# Patient Record
Sex: MALE | Race: WHITE | URBAN - METROPOLITAN AREA
[De-identification: names, ages, dates, MRNs, and addresses within clinical notes are randomized per-mention and may not be internally consistent; named-entity substitution may affect disease eponyms.]

---

## 2017-01-13 DIAGNOSIS — B20 HUMAN IMMUNODEFICIENCY VIRUS (HIV) DISEASE (H): Primary | ICD-10-CM

## 2017-01-24 ENCOUNTER — TRANSFERRED RECORDS (OUTPATIENT)
Dept: HEALTH INFORMATION MANAGEMENT | Facility: CLINIC | Age: 72
End: 2017-01-24

## 2017-02-03 ASSESSMENT — ENCOUNTER SYMPTOMS
HALLUCINATIONS: 0
EYE PAIN: 0
FATIGUE: 1
NIGHT SWEATS: 0
CHILLS: 0
MEMORY LOSS: 0
HEARTBURN: 1
BOWEL INCONTINENCE: 0
PARALYSIS: 0
NUMBNESS: 0
FEVER: 0
DYSURIA: 0
RECTAL BLEEDING: 0
RECTAL PAIN: 0
SEIZURES: 0
POLYPHAGIA: 1
CONSTIPATION: 1
WEAKNESS: 0
POLYDIPSIA: 0
FLANK PAIN: 1
TREMORS: 0
WEIGHT LOSS: 0
EYE IRRITATION: 0
DOUBLE VISION: 0
JAUNDICE: 0
HEMATURIA: 0
EYE REDNESS: 0
DIFFICULTY URINATING: 1
HEADACHES: 1
WEIGHT GAIN: 0
VOMITING: 0
BLOATING: 1
DISTURBANCES IN COORDINATION: 0
DIARRHEA: 0
BLOOD IN STOOL: 0
NAUSEA: 1
SPEECH CHANGE: 0
TINGLING: 0
DIZZINESS: 0
ALTERED TEMPERATURE REGULATION: 1
DECREASED APPETITE: 0
ABDOMINAL PAIN: 1
LOSS OF CONSCIOUSNESS: 0

## 2017-02-03 ASSESSMENT — ACTIVITIES OF DAILY LIVING (ADL)
ARE_THERE_CARBON_MONOXIDE_DETECTORS_IN_YOUR_HOME?: Y
ARE_THERE_SMOKE_DETECTORS_IN_YOUR_HOME?: Y
DO_MEMBERS_OF_YOUR_HOUSEHOLD_USE_SUNSCREEN?: Y
DO_MEMBERS_OF_YOUR_HOUSEHOLD_USE_SAFETY_HELMETS?: Y
ARE_THERE_FIREARMS_IN_YOUR_HOME?: N
DO_MEMBERS_OF_YOUR_HOUSEHOLD_WEAR_SEAT_BELTS?: Y

## 2017-02-08 DIAGNOSIS — B20 HUMAN IMMUNODEFICIENCY VIRUS (HIV) DISEASE (H): ICD-10-CM

## 2017-02-08 LAB
ALBUMIN SERPL-MCNC: 3.9 G/DL (ref 3.4–5)
ALP SERPL-CCNC: 124 U/L (ref 40–150)
ALT SERPL W P-5'-P-CCNC: 30 U/L (ref 0–70)
AMYLASE SERPL-CCNC: 58 U/L (ref 30–110)
ANION GAP SERPL CALCULATED.3IONS-SCNC: 6 MMOL/L (ref 3–14)
AST SERPL W P-5'-P-CCNC: 18 U/L (ref 0–45)
BASOPHILS # BLD AUTO: 0 10E9/L (ref 0–0.2)
BASOPHILS NFR BLD AUTO: 0.3 %
BILIRUB SERPL-MCNC: 0.5 MG/DL (ref 0.2–1.3)
BUN SERPL-MCNC: 16 MG/DL (ref 7–30)
CALCIUM SERPL-MCNC: 8.9 MG/DL (ref 8.5–10.1)
CD3 CELLS # BLD: 1626 CELLS/UL (ref 603–2990)
CD3 CELLS NFR BLD: 82 % (ref 49–84)
CD3+CD4+ CELLS # BLD: 469 CELLS/UL (ref 441–2156)
CD3+CD4+ CELLS NFR BLD: 24 % (ref 28–63)
CD3+CD4+ CELLS/CD3+CD8+ CLL BLD: 0.41 % (ref 1.4–2.6)
CD3+CD8+ CELLS # BLD: 1167 CELLS/UL (ref 125–1312)
CD3+CD8+ CELLS NFR BLD: 59 % (ref 10–40)
CHLORIDE SERPL-SCNC: 105 MMOL/L (ref 94–109)
CO2 SERPL-SCNC: 30 MMOL/L (ref 20–32)
CREAT SERPL-MCNC: 0.89 MG/DL (ref 0.66–1.25)
DIFFERENTIAL METHOD BLD: NORMAL
EOSINOPHIL # BLD AUTO: 0.2 10E9/L (ref 0–0.7)
EOSINOPHIL NFR BLD AUTO: 2.9 %
ERYTHROCYTE [DISTWIDTH] IN BLOOD BY AUTOMATED COUNT: 13.4 % (ref 10–15)
GFR SERPL CREATININE-BSD FRML MDRD: 84 ML/MIN/1.7M2
GLUCOSE SERPL-MCNC: 102 MG/DL (ref 70–99)
HCT VFR BLD AUTO: 42 % (ref 40–53)
HGB BLD-MCNC: 13.8 G/DL (ref 13.3–17.7)
IFC SPECIMEN: ABNORMAL
IMM GRANULOCYTES # BLD: 0 10E9/L (ref 0–0.4)
IMM GRANULOCYTES NFR BLD: 0.2 %
IMMUNODEFICIENCY MARKERS SPEC-IMP: ABNORMAL
LIPASE SERPL-CCNC: 154 U/L (ref 73–393)
LYMPHOCYTES # BLD AUTO: 1.9 10E9/L (ref 0.8–5.3)
LYMPHOCYTES NFR BLD AUTO: 29.7 %
MCH RBC QN AUTO: 30.4 PG (ref 26.5–33)
MCHC RBC AUTO-ENTMCNC: 32.9 G/DL (ref 31.5–36.5)
MCV RBC AUTO: 93 FL (ref 78–100)
MONOCYTES # BLD AUTO: 0.5 10E9/L (ref 0–1.3)
MONOCYTES NFR BLD AUTO: 8.6 %
NEUTROPHILS # BLD AUTO: 3.7 10E9/L (ref 1.6–8.3)
NEUTROPHILS NFR BLD AUTO: 58.3 %
NRBC # BLD AUTO: 0 10*3/UL
NRBC BLD AUTO-RTO: 0 /100
PLATELET # BLD AUTO: 225 10E9/L (ref 150–450)
POTASSIUM SERPL-SCNC: 4.6 MMOL/L (ref 3.4–5.3)
PROT SERPL-MCNC: 7.1 G/DL (ref 6.8–8.8)
RBC # BLD AUTO: 4.54 10E12/L (ref 4.4–5.9)
SODIUM SERPL-SCNC: 141 MMOL/L (ref 133–144)
WBC # BLD AUTO: 6.3 10E9/L (ref 4–11)

## 2017-02-08 PROCEDURE — 87536 HIV-1 QUANT&REVRSE TRNSCRPJ: CPT | Performed by: INTERNAL MEDICINE

## 2017-02-08 PROCEDURE — 86360 T CELL ABSOLUTE COUNT/RATIO: CPT | Performed by: INTERNAL MEDICINE

## 2017-02-08 PROCEDURE — 86359 T CELLS TOTAL COUNT: CPT | Performed by: INTERNAL MEDICINE

## 2017-02-10 LAB
HIV1 RNA # PLAS NAA DL=20: NORMAL {COPIES}/ML
HIV1 RNA SERPL NAA+PROBE-LOG#: NORMAL {LOG_COPIES}/ML

## 2017-02-17 ENCOUNTER — OFFICE VISIT (OUTPATIENT)
Dept: INTERNAL MEDICINE | Facility: CLINIC | Age: 72
End: 2017-02-17

## 2017-02-17 VITALS
DIASTOLIC BLOOD PRESSURE: 76 MMHG | WEIGHT: 150 LBS | BODY MASS INDEX: 22.73 KG/M2 | RESPIRATION RATE: 20 BRPM | OXYGEN SATURATION: 96 % | HEIGHT: 68 IN | HEART RATE: 81 BPM | SYSTOLIC BLOOD PRESSURE: 130 MMHG

## 2017-02-17 DIAGNOSIS — Z87.891 HISTORY OF TOBACCO USE: ICD-10-CM

## 2017-02-17 DIAGNOSIS — Z13.820 SCREENING FOR OSTEOPOROSIS: ICD-10-CM

## 2017-02-17 DIAGNOSIS — R79.9 ABNORMAL FINDING OF BLOOD CHEMISTRY: ICD-10-CM

## 2017-02-17 DIAGNOSIS — B20 HUMAN IMMUNODEFICIENCY VIRUS (HIV) DISEASE (H): ICD-10-CM

## 2017-02-17 DIAGNOSIS — Z87.891 HISTORY OF SMOKING: ICD-10-CM

## 2017-02-17 DIAGNOSIS — Z13.6 SCREENING FOR AAA (ABDOMINAL AORTIC ANEURYSM): ICD-10-CM

## 2017-02-17 DIAGNOSIS — D48.5 NEOPLASM OF UNCERTAIN BEHAVIOR OF SKIN: ICD-10-CM

## 2017-02-17 DIAGNOSIS — Z13.1 SCREENING FOR DIABETES MELLITUS: ICD-10-CM

## 2017-02-17 DIAGNOSIS — Z11.59 NEED FOR HEPATITIS C SCREENING TEST: ICD-10-CM

## 2017-02-17 DIAGNOSIS — Z00.00 ROUTINE GENERAL MEDICAL EXAMINATION AT A HEALTH CARE FACILITY: Primary | ICD-10-CM

## 2017-02-17 DIAGNOSIS — C32.9 LARYNGEAL CANCER (H): ICD-10-CM

## 2017-02-17 DIAGNOSIS — Z86.0100 HISTORY OF COLONIC POLYPS: ICD-10-CM

## 2017-02-17 LAB — HBA1C MFR BLD: 5.2 % (ref 4.3–6)

## 2017-02-17 PROCEDURE — 86803 HEPATITIS C AB TEST: CPT | Performed by: INTERNAL MEDICINE

## 2017-02-17 RX ORDER — TIMOLOL MALEATE 5 MG/ML
1 SOLUTION/ DROPS OPHTHALMIC 2 TIMES DAILY
COMMUNITY

## 2017-02-17 RX ORDER — LATANOPROST 50 UG/ML
1 SOLUTION/ DROPS OPHTHALMIC AT BEDTIME
COMMUNITY

## 2017-02-17 RX ORDER — LORATADINE 10 MG/1
10 TABLET ORAL PRN
COMMUNITY

## 2017-02-17 RX ORDER — CETIRIZINE HYDROCHLORIDE 10 MG/1
10 TABLET ORAL DAILY PRN
COMMUNITY

## 2017-02-17 RX ORDER — EMTRICITABINE AND TENOFOVIR DISOPROXIL FUMARATE 200; 300 MG/1; MG/1
1 TABLET, FILM COATED ORAL DAILY
COMMUNITY
End: 2017-02-17

## 2017-02-17 RX ORDER — PRAMIPEXOLE DIHYDROCHLORIDE 0.5 MG/1
TABLET ORAL DAILY
COMMUNITY

## 2017-02-17 ASSESSMENT — PAIN SCALES - GENERAL: PAINLEVEL: MILD PAIN (3)

## 2017-02-17 NOTE — MR AVS SNAPSHOT
After Visit Summary   2/17/2017    Leighton Randall    MRN: [id number]           Patient Information     Date Of Birth          1945        Visit Information        Provider Department      2/17/2017 1:50 PM Digna Tay MD [facility]        Today's Diagnoses     History of tobacco use    -  1    Need for hepatitis C screening test        Screening for AAA (abdominal aortic aneurysm)        History of smoking        History of colonic polyps        Laryngeal cancer (H)        Neoplasm of uncertain behavior of skin        Screening for diabetes mellitus        Abnormal finding of blood chemistry           Care Instructions    Primary Care Center Medication Refill Request Information:  * Please contact your pharmacy regarding ANY request for medication refills.  ** [facility] Prescription Fax = [phone]  * Please allow 3 business days for routine medication refills.  * Please allow 5 business days for controlled substance medication refills.     Primary Care Center Test Result notification information:  *You will be notified with in 7-10 days of your appointment day regarding the results of your test.  If you are on MyChart you will be notified as soon as the provider has reviewed the results and signed off on them.  Lung Cancer Screening   Frequently Asked Questions  If you are at high-risk for lung cancer, getting screened with low-dose computed tomography (LDCT) every year can help save your life. This handout offers answers to some of the most common questions about lung cancer screening. If you have other questions, please call [phone] ([phone]).     What is it?  Lung cancer screening uses special X-ray technology to create an image of your lung tissue. The exam is quick and easy and takes less than 10 seconds. We don t give you any medicine or use any needles. You can eat before and after the exam. You don t need to change your clothes as long as the  clothing on your chest doesn t contain metal. But, you do need to be able to hold your breath for at least 6 seconds during the exam.    What is the goal of lung cancer screening?  The goal of lung cancer screening is to save lives. Many times, lung cancer is not found until a person starts having physical symptoms. Lung cancer screening can help detect lung cancer in the earliest stages when it may be easier to treat.    Who should be screened for lung cancer?  We suggest lung cancer screening for anyone who is at high-risk for lung cancer. You are in the high-risk group if you:      are between the ages of 55 and 79, and    have smoked at least 1 pack of cigarettes a day for 30 or more years, and    still smoke or have quit within the past 15 years.    However, if you have a new cough or shortness of breath, you should talk to your doctor before being screened.    Some national lung health advocacy groups also recommend screening for people ages 50 to 79 who have smoked an average of 1 pack of cigarettes a day for 20 years. They must also have at least 1 other risk factor for lung cancer, not including exposure to secondhand smoke. Other risk factors are having had cancer in the past, emphysema, pulmonary fibrosis, COPD, a family history of lung cancer, or exposure to certain materials such as arsenic, asbestos, beryllium, cadmium, chromium, diesel fumes, nickel, radon or silica. Your care team can help you know if you have one of these risk factors.     Why does it matter if I have symptoms?  Certain symptoms can be a sign that you have a condition in your lungs that should be checked and treated by your doctor. These symptoms include fever, chest pain, a new or changing cough, shortness of breath that you have never felt before, coughing up blood or unexplained weight loss. Having any of these symptoms can greatly affect the results of lung cancer screening.       Should all smokers get an LDCT lung cancer  screening exam?  It depends. Lung cancer screening is for a very specific group of men and women who have a history of heavy smoking over a long period of time (see  Who should be screened for lung cancer  above).  I am in the high-risk group, but have been diagnosed with cancer in the past. Is LDCT lung cancer screening right for me?  In some cases, you should not have LDCT lung screening, such as when your doctor is already following your cancer with CT scan studies. Your doctor will help you decide if LDCT lung screening is right for you.  Do I need to have a screening exam every year?  Yes. If you are in the high-risk group described earlier, you should get an LDCT lung cancer screening exam every year until you are 79, or are no longer willing or able to undergo screening and possible procedures to diagnose and treat lung cancer.  How effective is LDCT at preventing death from lung cancer?  Studies have shown that LDCT lung cancer screening can lower the risk of death from lung cancer by 20 percent in people who are at high-risk.  What are the risks?  There are some risks and limitations of LDCT lung cancer screening. We want to make sure you understand the risks and benefits, so please let us know if you have any questions. Your doctor may want to talk with you more about these risks.    Radiation exposure: As with any exam that uses radiation, there is a very small increased risk of cancer. The amount of radiation in LDCT is small--about the same amount a person would get from a mammogram. Your doctor orders the exam when he or she feels the potential benefits outweigh the risks.    False negatives: No test is perfect, including LDCT. It is possible that you may have a medical condition, including lung cancer, that is not found during your exam. This is called a false negative result.    False positives and more testing: LDCT very often finds something in the lung that could be cancer, but in fact is not.  This is called a false positive result. False positive tests often cause anxiety. To make sure these findings are not cancer, you may need to have more tests. These tests will be done only if you give us permission. Sometimes patients need a treatment that can have side effects, such as a biopsy. For more information on false positives, see  What can I expect from the results?     Findings not related to lung cancer: Your LDCT exam also takes pictures of areas of your body next to your lungs. In a very small number of cases, the CT scan will show an abnormal finding in one of these areas, such as your kidneys, adrenal glands, liver or thyroid. This finding may not be serious, but you may need more tests. Your doctor can help you decide what other tests you may need, if any.  What can I expect from the results?  About 1 out of 4 LDCT exams will find something that may need more tests. Most of the time, these findings are lung nodules. Lung nodules are very small collections of tissue in the lung. These nodules are very common, and the vast majority--more than 97 percent--are not cancer (benign). Most are normal lymph nodes or small areas of scarring from past infections.  But, if a small lung nodule is found to be cancer, the cancer can be cured more than 90 percent of the time. To know if the nodule is cancer, we may need to get more images before your next yearly screening exam. If the nodule has suspicious features (for example, it is large, has an odd shape or grows over time), we will refer you to a specialist for further testing.  Will my doctor also get the results?  Yes. Your doctor will get a copy of your results.  Is it okay to keep smoking now that there s a cancer screening exam?  No. Tobacco is one of the strongest cancer-causing agents. It causes not only lung cancer, but other cancers and cardiovascular (heart) diseases as well. The damage caused by smoking builds over time. This means that the longer  you smoke, the higher your risk of disease. While it is never too late to quit, the sooner you quit, the better.  Where can I find help to quit smoking?  The best way to prevent lung cancer is to stop smoking. If you have already quit smoking, congratulations and keep it up! For help on quitting smoking, please call [employer] at 6-641-646-RRTD (4343) or the American Cancer Society at 1-478.111.2816 to find local resources near you.  One-on-one health coaching:  If you d prefer to work individually with a health care provider on tobacco cessation, we offer:      Medication Therapy Management:  Our specially trained pharmacists work closely with you and your doctor to help you quit smoking.  Call [phone] or [phone] (toll free).     Can Do: Health coaching offered by [facility].  [email or web address]                Follow-ups after your visit        Additional Services     DERMATOLOGY REFERRAL       Your provider has referred you to: [facility]: Dermatology Clinic - [place] [phone]   [email or web address]    Lesion on r ear.  Poss basal cell ca  Please be aware that coverage of these services is subject to the terms and limitations of your health insurance plan.  Call member services at your health plan with any benefit or coverage questions.      Please bring the following with you to your appointment:    (1) Any X-Rays, CTs or MRIs which have been performed.  Contact the facility where they were done to arrange for  prior to your scheduled appointment.    (2) List of current medications  (3) This referral request   (4) Any documents/labs given to you for this referral                  Your next 10 appointments already scheduled     Feb 17, 2017  3:15 PM CST   LAB with  LAB    Health Lab ([facility])    [address]  1st Floor  [place] [address] [phone]           Patient must bring picture ID.   Patient should be prepared to give a urine specimen  Please do not eat 10-12 hours before your appointment if you are coming in fasting for labs on lipids, cholesterol, or glucose (sugar).  Pregnant women should follow their Care Team instructions. Water with medications is okay. Do not drink coffee or other fluids.   If you have concerns about taking  your medications, please ask at office or if scheduling via [employer], send a message by clicking on Secure Messaging, Message Your Care Team.            Feb 20, 2017  8:30 AM CST   (Arrive by 8:15 AM)   New Patient Visit with Gail Kirkland MD   [facility] ([facility])    [address] [place] [address] [phone]            Mar 21, 2017  1:45 PM CDT   (Arrive by 1:30 PM)   New Patient Visit with Nahum Boo MD   [facility] ([facility])    [address] [place] [address] [phone]              Future tests that were ordered for you today     Open Future Orders        Priority Expected Expires Ordered    Hepatitis C Screen Reflex to HCV RNA Quant and Genotype Routine 2/17/2017 2/17/2018 2/17/2017    [place] Aorta Medicare AAA Screening Routine  2/17/2018 2/17/2017    Hemoglobin A1c Routine 2/17/2017 3/3/2017 2/17/2017    CT Chest Lung Cancer Scrn Low Dose wo Routine  2/17/2018 2/17/2017            Who to contact     Please call your clinic at [phone] to:    Ask questions about your health    Make or cancel appointments    Discuss your medicines    Learn about your test results    Speak to your doctor   If you have compliments or concerns about an experience at your clinic, or if you wish to file a complaint, please contact [facility] Patient Relations at [phone] or email us at [email or web address]         Additional Information About Your Visit    "     MyChart Information     [employer] gives you secure access to your electronic health record. If you see a primary care provider, you can also send messages to your care team and make appointments. If you have questions, please call your primary care clinic.  If you do not have a primary care provider, please call [phone] and they will assist you.      [employer] is an electronic gateway that provides easy, online access to your medical records. With [employer], you can request a clinic appointment, read your test results, renew a prescription or communicate with your care team.     To access your existing account, please contact your [facility] or call [phone] for assistance.        Care EveryWhere ID     This is your Care EveryWhere ID. This could be used by other organizations to access your [place] medical records  [id number]        Your Vitals Were     Pulse Respirations Height Pulse Oximetry BMI (Body Mass Index)       81 20 1.727 m (5' 8\") 96% 22.81 kg/m2        Blood Pressure from Last 3 Encounters:   02/17/17 130/76    Weight from Last 3 Encounters:   02/17/17 68 kg (150 lb)              We Performed the Following     Aortic Center Notification     DERMATOLOGY REFERRAL     [place] for Smoking Cessation Study (PLUTO) to Contact Patient     Prof fee: Shared Decisionmaking for Lung Cancer Screening          Today's Medication Changes          These changes are accurate as of: 2/17/17  3:12 PM.  If you have any questions, ask your nurse or doctor.               These medicines have changed or have updated prescriptions.        Dose/Directions    DESCOVY PO   This may have changed:  Another medication with the same name was removed. Continue taking this medication, and follow the directions you see here.   Changed by:  Digna Tay MD        Dose:  1 tablet   Take 1 tablet by mouth daily   Refills:  0         Stop taking these medicines if you haven't already. Please " contact your care team if you have questions.     TRUVADA 200-300 MG per tablet   Generic drug:  emtricitabine-tenofovir   Stopped by:  Digna Tay MD                    Primary Care Provider Office Phone # Fax #    Digna Tay [phone]       [facility] [address] [address]        Thank you!     Thank you for choosing [facility]  for your care. Our goal is always to provide you with excellent care. Hearing back from our patients is one way we can continue to improve our services. Please take a few minutes to complete the written survey that you may receive in the mail after your visit with us. Thank you!             Your Updated Medication List - Protect others around you: Learn how to safely use, store and throw away your medicines at www.disposemymeds.org.          This list is accurate as of: 2/17/17  3:12 PM.  Always use your most recent med list.                   Brand Name Dispense Instructions for use    BENADRYL PO      Take 25 mg by mouth as needed       cetirizine 10 MG tablet    zyrTEC     Take 10 mg by mouth daily as needed for allergies       DESCOVY PO      Take 1 tablet by mouth daily       dolutegravir 50 MG tablet    TIVICAY     Take 50 mg by mouth daily       loratadine 10 MG tablet    CLARITIN     Take 10 mg by mouth as needed for allergies       OMEPRAZOLE PO      Take 20 mg by mouth       pramipexole 0.5 MG tablet    MIRAPEX     Take by mouth daily       timolol 0.5 % ophthalmic solution    TIMOPTIC     Place 1 drop Into the left eye 2 times daily       TYLENOL PO      Take 325 mg by mouth as needed for mild pain or fever       XALATAN 0.005 % ophthalmic solution   Generic drug:  latanoprost      Place 1 drop into both eyes At Bedtime

## 2017-02-17 NOTE — PROGRESS NOTES
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 Tay. He was previously cared for through [facility]. He has no current complaints. He was recently seen by GI for foul-smelling stools and bloating diagnosed as SIBO and treated empirically with rifaximin. Since then, his symptoms have improved. He denies CP, headache, vision changes, hearing changes, chronic abdominal pain, SOB, LE edema, claudication, or fevers. He mentions that over the past year since his abdominal surgeries he has felt slightly more fatigued and \"out of shape\", most notable when he exerts himself.     HCM:   tdap 5/2013  Pneumovax utd    Colonoscopy: done at [facility] 11/3/16            Social History   Substance Use Topics     Smoking status: Former Smoker     Types: Cigarettes     Smokeless tobacco: Never Used      Comment: Stopped two years ago     Alcohol use Not on file   40 pack-year smoking history. Reports playing on asbestos-coated pipes as a child.     Fhx:   Medical History Relation Name Comments   Glaucoma Mother       Diabetes Sister             Medications and allergies reviewed by me today.       Review Of Systems    Answers for HPI/ROS submitted by the patient on 2/3/2017   Annual Exam:  General Symptoms: Yes  Skin Symptoms: No  HENT Symptoms: No  EYE SYMPTOMS: Yes  HEART SYMPTOMS: No  LUNG SYMPTOMS: No  INTESTINAL SYMPTOMS: Yes  URINARY SYMPTOMS: Yes  REPRODUCTIVE SYMPTOMS: No  SKELETAL SYMPTOMS: No  BLOOD SYMPTOMS: No  NERVOUS SYSTEM SYMPTOMS: Yes  MENTAL HEALTH SYMPTOMS: No  Fever: No  Loss of appetite: No  Weight loss: No  Weight gain: No  Fatigue: Yes  Night sweats: No  Chills: No  Increased stress: No  Excessive hunger: Yes  Excessive thirst: No  Feeling hot or " "cold when others believe the temperature is normal: Yes  Loss of height: No  Post-operative complications: Yes  Surgical site pain: Yes  Hallucinations: No  Hyperactivity: No  Confusion: No  Eye pain: No  Vision loss: No  Dry eyes: No  Eye bulging: No  Double vision: No  Flashing of lights: No  Spots: No  Floaters: Yes  Redness: No  Crossed eyes: No  Tunnel Vision: No  Yellowing of eyes: No  Eye irritation: No  Heart burn or indigestion: Yes  Nausea: Yes  Vomiting: No  Abdominal pain: Yes  Bloating: Yes  Constipation: Yes  Diarrhea: No  Blood in stool: No  Black stools: No  Rectal or Anal pain: No  Fecal incontinence: No  Rectal bleeding: No  Yellowing of skin or eyes: No  Vomit with blood: No  Change in stools: Yes  Hemorrhoids: Yes  Trouble holding urine or incontinence: No  Pain or burning: No  Trouble starting or stopping: Yes  Increased frequency of urination: No  Blood in urine: No  Decreased frequency of urination: No  Frequent nighttime urination: No  Flank pain: Yes  Difficulty emptying bladder: Yes  Trouble with coordination: No  Dizziness or trouble with balance: No  Fainting or black-out spells: No  Memory loss: No  Headache: Yes  Seizures: No  Speech problems: No  Tingling: No  Tremor: No  Weakness: No  Difficulty walking: No  Paralysis: No  Numbness: No  Frequency of exercise:: 2-3 days/week  Duration of exercise:: Greater than 60 minutes    OBJECTIVE:    /76 (BP Location: Right arm, Patient Position: Chair, Cuff Size: Adult Regular)  Pulse 81  Resp 20  Ht 1.727 m (5' 8\")  Wt 68 kg (150 lb)  SpO2 96%  BMI 22.81 kg/m2   Wt Readings from Last 1 Encounters:   02/17/17 68 kg (150 lb)       Constitutional: no distress, comfortable, pleasant   HEENT anicteric  PERRL  OP clear, Dentition fair with missing tooth. 3mm pearly appearing dome-shaped mass on lobe of R ear.  Also darkly pigmented lesion approx 6 mm in size upper L back with some irregularity.   Neck:  supple with full range of motion, no " thyromegaly.   Nodes: no cervical, supraclavicular or axillary lad  Cardiovascular: regular rate and rhythm, normal S1 and S2, no murmurs, rubs or gallops,   Respiratory: clear to auscultation, no wheezes or crackles, normal breath sounds and normal expiratory phase, expiration through pursed lips  Gastrointestinal: 6 cm by 11 cm scar over midline abdomen at umbilicus without drainage, NT/ND,   positive bowel sounds all four quadrants no hepatosplenomegaly, no masses   Ext:  no edema      ASSESSMENT/PLAN:  Leighton was seen today for establish care.  Reviewed and updated HCM today.  Immunizations are utd.  Due for AAA screening, DEXA scan (HIV infectin) Hep C screening and lung cancer screening.   Will also have A1c done as well.  Lipids done 5/16 and was normal.     Diagnoses and all orders for this visit:    History of tobacco use- lung cancer screening.   -     Prof fee: Shared Decisionmaking for Lung Cancer Screening  -     CT Chest Lung Cancer Scrn Low Dose wo; Future  -     Okay for Smoking Cessation Study (PLUTO) to Contact Patient    Need for hepatitis C screening test  -     Hepatitis C Screen Reflex to HCV RNA Quant and Genotype; Future    Screening for AAA (abdominal aortic aneurysm)  -     Aortic Center Notification  -      Aorta Medicare AAA Screening; Future    History of colonic polyps  -Recent colonoscopy in 11/2016.     Laryngeal cancer (H)- had evaluation in 11/16.    -Up to date on follow-up with ENT     Neoplasm of uncertain behavior of skin- papular lesion pearly R earlobe approx 5 mm in size with visible vessel.  Suspect basal cell.  Also ? Dysplastic nevus L upper back.    -     DERMATOLOGY REFERRAL    Screening for diabetes mellitus  -     Hemoglobin A1c; Future    Abnormal finding of blood chemistry   -     Hemoglobin A1c; Future          Pt should return to clinic for f/u with me in 4 months or sooner as needed.       Nicolás Campos MS4, acting as a scribe for Digna Tay,  MD          Lung Cancer Screening Shared Decision Making Visit     Leighton Randall is eligible for lung cancer screening on the basis of the information provided in my signed lung cancer screening order.     I have discussed with patient the risks and benefits of screening for lung cancer with low-dose CT.     The risks include:   radiation exposure    false positives     over-diagnosis    The benefit of early detection of lung cancer is contingent upon adherence to annual screening or more frequent follow up if indicated.     Furthermore, reaping the benefits of screening requires Leighton Randall to be willing and physically able to undergo diagnostic procedures, if indicated. Although no specific guide is available for determining severity of comorbidities, it is reasonable to withhold screening in patients who have greater mortality risk from other diseases.     We did discuss that the only way to prevent lung cancer is to not smoke. Smoking cessation assistance was not offered.    I did not offer risk estimation using a calculator such as this one:    ShouldIScreen    The medical student acted as scribe and the encounter documented above was completely performed by myself.  Supervising Doctor Digna Tay

## 2017-02-17 NOTE — PATIENT INSTRUCTIONS
[facility] Medication Refill Request Information:  * Please contact your pharmacy regarding ANY request for medication refills.  ** [facility] Prescription Fax = [phone]  * Please allow 3 business days for routine medication refills.  * Please allow 5 business days for controlled substance medication refills.     [facility] Test Result notification information:  *You will be notified with in 7-10 days of your appointment day regarding the results of your test.  If you are on MyChart you will be notified as soon as the provider has reviewed the results and signed off on them.  Lung Cancer Screening   Frequently Asked Questions  If you are at high-risk for lung cancer, getting screened with low-dose computed tomography (LDCT) every year can help save your life. This handout offers answers to some of the most common questions about lung cancer screening. If you have other questions, please call [phone] ([phone]).     What is it?  Lung cancer screening uses special X-ray technology to create an image of your lung tissue. The exam is quick and easy and takes less than 10 seconds. We don t give you any medicine or use any needles. You can eat before and after the exam. You don t need to change your clothes as long as the clothing on your chest doesn t contain metal. But, you do need to be able to hold your breath for at least 6 seconds during the exam.    What is the goal of lung cancer screening?  The goal of lung cancer screening is to save lives. Many times, lung cancer is not found until a person starts having physical symptoms. Lung cancer screening can help detect lung cancer in the earliest stages when it may be easier to treat.    Who should be screened for lung cancer?  We suggest lung cancer screening for anyone who is at high-risk for lung cancer. You are in the high-risk group if you:      are between the ages of 55 and 79, and    have smoked at least 1 pack of cigarettes a day  for 30 or more years, and    still smoke or have quit within the past 15 years.    However, if you have a new cough or shortness of breath, you should talk to your doctor before being screened.    Some national lung health advocacy groups also recommend screening for people ages 50 to 79 who have smoked an average of 1 pack of cigarettes a day for 20 years. They must also have at least 1 other risk factor for lung cancer, not including exposure to secondhand smoke. Other risk factors are having had cancer in the past, emphysema, pulmonary fibrosis, COPD, a family history of lung cancer, or exposure to certain materials such as arsenic, asbestos, beryllium, cadmium, chromium, diesel fumes, nickel, radon or silica. Your care team can help you know if you have one of these risk factors.     Why does it matter if I have symptoms?  Certain symptoms can be a sign that you have a condition in your lungs that should be checked and treated by your doctor. These symptoms include fever, chest pain, a new or changing cough, shortness of breath that you have never felt before, coughing up blood or unexplained weight loss. Having any of these symptoms can greatly affect the results of lung cancer screening.       Should all smokers get an LDCT lung cancer screening exam?  It depends. Lung cancer screening is for a very specific group of men and women who have a history of heavy smoking over a long period of time (see  Who should be screened for lung cancer  above).  I am in the high-risk group, but have been diagnosed with cancer in the past. Is LDCT lung cancer screening right for me?  In some cases, you should not have LDCT lung screening, such as when your doctor is already following your cancer with CT scan studies. Your doctor will help you decide if LDCT lung screening is right for you.  Do I need to have a screening exam every year?  Yes. If you are in the high-risk group described earlier, you should get an LDCT lung  cancer screening exam every year until you are 79, or are no longer willing or able to undergo screening and possible procedures to diagnose and treat lung cancer.  How effective is LDCT at preventing death from lung cancer?  Studies have shown that LDCT lung cancer screening can lower the risk of death from lung cancer by 20 percent in people who are at high-risk.  What are the risks?  There are some risks and limitations of LDCT lung cancer screening. We want to make sure you understand the risks and benefits, so please let us know if you have any questions. Your doctor may want to talk with you more about these risks.    Radiation exposure: As with any exam that uses radiation, there is a very small increased risk of cancer. The amount of radiation in LDCT is small--about the same amount a person would get from a mammogram. Your doctor orders the exam when he or she feels the potential benefits outweigh the risks.    False negatives: No test is perfect, including LDCT. It is possible that you may have a medical condition, including lung cancer, that is not found during your exam. This is called a false negative result.    False positives and more testing: LDCT very often finds something in the lung that could be cancer, but in fact is not. This is called a false positive result. False positive tests often cause anxiety. To make sure these findings are not cancer, you may need to have more tests. These tests will be done only if you give us permission. Sometimes patients need a treatment that can have side effects, such as a biopsy. For more information on false positives, see  What can I expect from the results?     Findings not related to lung cancer: Your LDCT exam also takes pictures of areas of your body next to your lungs. In a very small number of cases, the CT scan will show an abnormal finding in one of these areas, such as your kidneys, adrenal glands, liver or thyroid. This finding may not be serious,  but you may need more tests. Your doctor can help you decide what other tests you may need, if any.  What can I expect from the results?  About 1 out of 4 LDCT exams will find something that may need more tests. Most of the time, these findings are lung nodules. Lung nodules are very small collections of tissue in the lung. These nodules are very common, and the vast majority--more than 97 percent--are not cancer (benign). Most are normal lymph nodes or small areas of scarring from past infections.  But, if a small lung nodule is found to be cancer, the cancer can be cured more than 90 percent of the time. To know if the nodule is cancer, we may need to get more images before your next yearly screening exam. If the nodule has suspicious features (for example, it is large, has an odd shape or grows over time), we will refer you to a specialist for further testing.  Will my doctor also get the results?  Yes. Your doctor will get a copy of your results.  Is it okay to keep smoking now that there s a cancer screening exam?  No. Tobacco is one of the strongest cancer-causing agents. It causes not only lung cancer, but other cancers and cardiovascular (heart) diseases as well. The damage caused by smoking builds over time. This means that the longer you smoke, the higher your risk of disease. While it is never too late to quit, the sooner you quit, the better.  Where can I find help to quit smoking?  The best way to prevent lung cancer is to stop smoking. If you have already quit smoking, congratulations and keep it up! For help on quitting smoking, please call [employer] at 6-813-375-VZPQ ([phone]) or the [employer] at 1-992.183.8097 to find local resources near you.  One-on-one health coaching:  If you d prefer to work individually with a health care provider on tobacco cessation, we offer:      Medication Therapy Management:  Our specially trained pharmacists work closely with you and your doctor to help you  quit smoking.  Call [phone] or [phone] (toll free).     Can Do: Health coaching offered by [facility].  [email or web address]        AVS given and checked out.Scheduled referrals and lab with clinic coordinator.Mary Haddad LPN 3:13 PM on 2/17/2017

## 2017-02-17 NOTE — NURSING NOTE
Chief Complaint   Patient presents with     Establish Care     establish care/provider-physical    Mary Haddad LPN 1:55 PM on 2/17/2017

## 2017-02-17 NOTE — LETTER
Patient:  Leighton Randall  :   1945  MRN:     [id number]        Mr. Leighton Randall  [address] [place] [address]        2017    Dear Mr. Randall,    Thank you for choosing the [facility] for your healthcare needs.  We appreciate the opportunity to serve you.    The following are your recent test results.     They were able to review an old CT scan and review the appearance of your aorta. There is no aneurysm.  You do not have to have the ultrasound.     Digna Pierson MD         Results for orders placed or performed in visit on 17   [facility] Center Notification    Narrative    CT  3/28/2016 No evidence of AAA       Please contact your provider if you have any questions or concerns.  We look forward to serving your needs in the future.      Sincerely,    Dr Tay

## 2017-02-18 LAB — HCV AB SERPL QL IA: NORMAL

## 2017-02-18 ASSESSMENT — ENCOUNTER SYMPTOMS
FATIGUE: 1
DIARRHEA: 0
TINGLING: 0
LOSS OF CONSCIOUSNESS: 0
FEVER: 0
EYE PAIN: 1
ABDOMINAL PAIN: 1
DIZZINESS: 0
BOWEL INCONTINENCE: 0
HALLUCINATIONS: 0
WEIGHT LOSS: 0
INCREASED ENERGY: 1
NAUSEA: 1
INSOMNIA: 1
EYE IRRITATION: 1
WEAKNESS: 0
FLANK PAIN: 0
WEIGHT GAIN: 0
HEMATURIA: 0
NIGHT SWEATS: 0
PANIC: 0
SPEECH CHANGE: 0
SEIZURES: 0
RECTAL BLEEDING: 0
POLYDIPSIA: 0
DOUBLE VISION: 0
BLOATING: 1
ALTERED TEMPERATURE REGULATION: 1
HEARTBURN: 1
DYSURIA: 0
NUMBNESS: 0
CONSTIPATION: 1
JAUNDICE: 0
HEADACHES: 1
EYE WATERING: 0
BLOOD IN STOOL: 0
TREMORS: 0
PARALYSIS: 0
POLYPHAGIA: 1
DIFFICULTY URINATING: 1
DEPRESSION: 0
CHILLS: 0
NERVOUS/ANXIOUS: 0
RECTAL PAIN: 0
VOMITING: 0
DISTURBANCES IN COORDINATION: 0
MEMORY LOSS: 0
DECREASED CONCENTRATION: 1
DECREASED APPETITE: 0
EYE REDNESS: 0

## 2017-02-20 ENCOUNTER — OFFICE VISIT (OUTPATIENT)
Dept: INFECTIOUS DISEASES | Facility: CLINIC | Age: 72
End: 2017-02-20
Attending: INTERNAL MEDICINE
Payer: COMMERCIAL

## 2017-02-20 ENCOUNTER — DOCUMENTATION ONLY (OUTPATIENT)
Dept: VASCULAR SURGERY | Facility: CLINIC | Age: 72
End: 2017-02-20

## 2017-02-20 VITALS
BODY MASS INDEX: 22.66 KG/M2 | WEIGHT: 153 LBS | DIASTOLIC BLOOD PRESSURE: 82 MMHG | HEIGHT: 69 IN | HEART RATE: 80 BPM | SYSTOLIC BLOOD PRESSURE: 154 MMHG | TEMPERATURE: 98.2 F

## 2017-02-20 DIAGNOSIS — B20 HUMAN IMMUNODEFICIENCY VIRUS (HIV) DISEASE (H): ICD-10-CM

## 2017-02-20 PROCEDURE — 99213 OFFICE O/P EST LOW 20 MIN: CPT | Mod: ZF

## 2017-02-20 ASSESSMENT — PAIN SCALES - GENERAL: PAINLEVEL: MILD PAIN (3)

## 2017-02-20 NOTE — NURSING NOTE
"Chief Complaint   Patient presents with     Consult     establish care with juanita PRABHAKAR cma       Initial /82  Pulse 80  Temp 98.2  F (36.8  C) (Oral)  Ht 1.74 m (5' 8.5\")  Wt 69.4 kg (153 lb)  BMI 22.93 kg/m2 Estimated body mass index is 22.93 kg/(m^2) as calculated from the following:    Height as of this encounter: 1.74 m (5' 8.5\").    Weight as of this encounter: 69.4 kg (153 lb).  Medication Reconciliation: complete    "

## 2017-02-20 NOTE — LETTER
2/20/2017       RE: Leighton Randall  [address]  [facility] [id number]     Dear Colleague,    Thank you for referring your patient, Leighton Randall, to the [facility] AND INFECTIOUS DISEASES at [facility]. Please see a copy of my visit note below.    [facility] - HIV Initiation of Care  Dr. Gail Kirkland, [facility], [facility], [address] [place] [address], Sixth Floor, [address]  [place] [address]  Patient:  Leighton Randall, Date of birth 1945, Medical record number [id number]  Date of Visit:  02/20/2017         Assessment and Recommendations:     Human immunodeficiency virus (HIV) disease (H)  Continue Tivicay and Descovy. 2/8/17 - Virally suppressed, CD4 469. Follow up in 6 months.     Preventative Medicine  Cardiovascular Jonathan -   Checked 5/2016, LDL - 74, Total Chol 118   Blood Pressure - 154/82 today, will monitor  Cancer Screening   Colon - Followed by [facility]   Anal - 6/2009 - Negative for malignant cells. Anal-rectal transitional zone component not present  Immunizations   Hepatitis A - Reactive 8/23/2006   Hepatitis B - HepB Surface Ab + on 8/23/2006   Influenza - 10/13/2016   Pneumovax/Prevnar - Up to date. PCV23 - 2/7/2008. PCV13 - 5/3/2013   Tdap - Up to date - 5/3/13  Bone Health - No screening indicated at this time  Renal Health  Sexually Transmitted Infection Risk and Screening   Gonorrhea and Chlamydia   Syphilis    Gail Kirkland MD  Division of Infectious Diseases and International Medicine  [phone]         History of Infectious Disease Illness:   Mr. Carlson is a patient who has previously been seen at [facility] but is transferring care to the [facility].  Has been well controlled on his current regimen of Tivicay and Descovy and he reports that he has been tolerating it  well with a high level of adherence. No nausea/ vomiting/ diarrhea.  No fevers or chills.  No cough or chest pain. He has had a difficult couple years with respect to several other medical issues. Specifically, he had an appendectomy in the 1970's that was complicated by adhesions needing multiple surgeries.  He required mesh placement for anterior hernia repair and in 2015 developed a mesh infection that required removal of the mesh and bowel resection. He also underwent treatment for SIBO (seen at [facility]). He is feeling a little out of shape with the season.  In the summer he spend a lot of time gardening.         HIV History:   Date of Diagnosis: 1989  Approximated time of transmission: 1989, caught in acute retroviral syndrome  CD4 Lenin: 125  Viral Load at Diagnosis:   Risk Factors:   Opportunistic Infections:  CMV Status:  Toxo Status:  HLA  Status: negative  Tuberculosis Screening:  Historical use of ARVs: Complete ART history unknown, but has included: DDI, 3TC, Tenofovir, Kaletra, AZT monotherapy, On Truvada + Kaletra since at least 2007. Switched to Truvada + Dolutegravir 12/18/2014 for treatment simplification. Switched to Descovy + Dolutegravir 10/13/2016 for reduced pill size and better safety profile.         Past Medical and Surgical History:     AIDS (acquired immune deficiency syndrome) (**) 5/25/2007   Diagnosis 1989.     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 Stable nodule at last exam early 2007. Yearly follow-up recommended.     Tobacco dependence 5/25/2007         Family History:     Glaucoma Mother       Diabetes Sister            Social History:     Social History   Substance Use  Topics     Smoking status: Former Smoker     Types: Cigarettes     Smokeless tobacco: Never Used      Comment: Stopped two years ago     Alcohol use Not on file     Social History     Social History Narrative            Review of Systems:   Answers for HPI/ROS submitted by the patient on 2/18/2017   General Symptoms: Yes  Skin Symptoms: No  HENT Symptoms: No  EYE SYMPTOMS: Yes  HEART SYMPTOMS: No  LUNG SYMPTOMS: No  INTESTINAL SYMPTOMS: Yes  URINARY SYMPTOMS: Yes  REPRODUCTIVE SYMPTOMS: No  SKELETAL SYMPTOMS: No  BLOOD SYMPTOMS: No  NERVOUS SYSTEM SYMPTOMS: Yes  MENTAL HEALTH SYMPTOMS: Yes  Fever: No  Loss of appetite: No  Weight loss: No  Weight gain: No  Fatigue: Yes  Night sweats: No  Chills: No  Increased stress: No  Excessive hunger: Yes  Excessive thirst: No  Feeling hot or cold when others believe the temperature is normal: Yes  Loss of height: No  Post-operative complications: Yes  Surgical site pain: Yes  Hallucinations: No  Change in or Loss of Energy: Yes  Hyperactivity: No  Confusion: No  Eye pain: Yes  Vision loss: No  Dry eyes: No  Watery eyes: No  Eye bulging: No  Double vision: No  Flashing of lights: No  Spots: No  Floaters: Yes  Redness: No  Crossed eyes: No  Tunnel Vision: No  Yellowing of eyes: No  Eye irritation: Yes  Heart burn or indigestion: Yes  Nausea: Yes  Vomiting: No  Abdominal pain: Yes  Bloating: Yes  Constipation: Yes  Diarrhea: No  Blood in stool: No  Black stools: No  Rectal or Anal pain: No  Fecal incontinence: No  Rectal bleeding: No  Yellowing of skin or eyes: No  Vomit with blood: No  Change in stools: Yes  Hemorrhoids: Yes  Trouble holding urine or incontinence: No  Pain or burning: No  Trouble starting or stopping: Yes  Increased frequency of urination: No  Blood in urine: No  Decreased frequency of urination: No  Frequent nighttime urination: No  Flank pain: No  Difficulty emptying bladder: Yes  Trouble with coordination: No  Dizziness or trouble with balance: No  Fainting or  "black-out spells: No  Memory loss: No  Headache: Yes  Seizures: No  Speech problems: No  Tingling: No  Tremor: No  Weakness: No  Difficulty walking: No  Paralysis: No  Numbness: No  Nervous or Anxious: No  Depression: No  Trouble sleeping: Yes  Trouble thinking or concentrating: Yes  Mood changes: No  Panic attacks: No           Current Medications:     Current Outpatient Prescriptions   Medication Sig Dispense Refill     Emtricitabine-Tenofovir AF (DESCOVY PO) Take 1 tablet by mouth daily       dolutegravir (TIVICAY) 50 MG tablet Take 50 mg by mouth daily       DiphenhydrAMINE HCl (BENADRYL PO) Take 25 mg by mouth as needed       cetirizine (ZYRTEC) 10 MG tablet Take 10 mg by mouth daily as needed for allergies       Acetaminophen (TYLENOL PO) Take 325 mg by mouth as needed for mild pain or fever       loratadine (CLARITIN) 10 MG tablet Take 10 mg by mouth as needed for allergies       pramipexole (MIRAPEX) 0.5 MG tablet Take by mouth daily       timolol (TIMOPTIC) 0.5 % ophthalmic solution Place 1 drop Into the left eye 2 times daily       latanoprost (XALATAN) 0.005 % ophthalmic solution Place 1 drop into both eyes At Bedtime       OMEPRAZOLE PO Take 20 mg by mouth              Immunization History:     Immunization History   Administered Date(s) Administered     Influenza (High Dose) 3 valent vaccine 10/13/2016     Pneumococcal 23 valent 02/07/2008     TDAP (BOOSTRIX AGES 10-64) 05/13/2013            Allergies:     Allergies   Allergen Reactions     Amoxicillin      Hives     Wellbutrin [Bupropion]      Rash, hives            Physical Exam:   Vital signs:  /82  Pulse 80  Temp 98.2  F (36.8  C) (Oral)  Ht 1.74 m (5' 8.5\")  Wt 69.4 kg (153 lb)  BMI 22.93 kg/m2    Physical Examination:  GENERAL:  well-developed, well-nourished, seated in no acute distress.  HEENT:  Head is normocephalic, atraumatic   EYES:  Eyes have anicteric sclerae without conjunctival injection   ENT:  Oropharynx is moist without exudates " or ulcers. Tongue is midline  NECK:  Supple. No cervical lymphadenopathy  LUNGS:  Clear to auscultation bilateral.   CARDIOVASCULAR:  Regular rate and rhythm with no murmurs, gallops or rubs.  ABDOMEN:  Normal bowel sounds, soft, nontender. Well healed complex anterior abdominal scar.   SKIN:  No acute rashes.    NEUROLOGIC:  Grossly nonfocal. Active x4 extremities         Laboratory Data:     CD4 Count  Absolute CD4   Date Value Ref Range Status   02/08/2017 469 441 - 2156 cells/uL Final     CD4 [place] T   Date Value Ref Range Status   02/08/2017 24 (L) 28 - 63 % Final     CD4:CD8 Ratio   Date Value Ref Range Status   02/08/2017 0.41 (L) 1.40 - 2.60 Final       HIV-1 RNA Quantitative:  HIV-1 RNA Quant Result   Date Value Ref Range Status   02/08/2017  HIVND [Copies]/mL Final    HIV-1 RNA Not Detected   The RUBA AmpliPrep/RUBA TaqMan HIV-1 test is an FDA-approved in vitro nucleic   acid amplification test for the quantitation of HIV-1 RNA in human plasma (EDTA   plasma) using the RUBA AmpliPrep instrument for automated viral nucleic acid   extraction and the RUBA TaqMan Analyzer or [employer] TaqMan for automated Real   Time PCR amplification and detection of the viral nucleic acid target.   Titer results are reported in copies/ml. This assay is intended for use in   conjunction with clinical presentation and other laboratory markers of disease   prognosis and for use as an aid in assessing viral response to antiretroviral   treatment as measured by changes in plasma HIV-1 RNA levels. This test should   not be used as a donor screening test to confirm the presence of HIV-1   infection.         Metabolic Studies       Sodium   Date Value Ref Range Status   02/08/2017 141 133 - 144 mmol/L Final     Potassium   Date Value Ref Range Status   02/08/2017 4.6 3.4 - 5.3 mmol/L Final     Chloride   Date Value Ref Range Status   02/08/2017 105 94 - 109 mmol/L Final     Carbon Dioxide   Date Value Ref Range Status   02/08/2017  30 20 - 32 mmol/L Final     Anion Gap   Date Value Ref Range Status   02/08/2017 6 3 - 14 mmol/L Final     Urea Nitrogen   Date Value Ref Range Status   02/08/2017 16 7 - 30 mg/dL Final     Creatinine   Date Value Ref Range Status   02/08/2017 0.89 0.66 - 1.25 mg/dL Final     GFR Estimate   Date Value Ref Range Status   02/08/2017 84 >60 mL/min/1.7m2 Final     Comment:     Non  GFR Calc     Glucose   Date Value Ref Range Status   02/08/2017 102 (H) 70 - 99 mg/dL Final     Hemoglobin A1C   Date Value Ref Range Status   02/17/2017 5.2 4.3 - 6.0 % Final     Calcium   Date Value Ref Range Status   02/08/2017 8.9 8.5 - 10.1 mg/dL Final       Hepatic Studies    Bilirubin Total   Date Value Ref Range Status   02/08/2017 0.5 0.2 - 1.3 mg/dL Final     Alkaline Phosphatase   Date Value Ref Range Status   02/08/2017 124 40 - 150 U/L Final     Albumin   Date Value Ref Range Status   02/08/2017 3.9 3.4 - 5.0 g/dL Final     AST   Date Value Ref Range Status   02/08/2017 18 0 - 45 U/L Final     ALT   Date Value Ref Range Status   02/08/2017 30 0 - 70 U/L Final       Hematology Studies      WBC   Date Value Ref Range Status   02/08/2017 6.3 4.0 - 11.0 10e9/L Final     Absolute Neutrophil   Date Value Ref Range Status   02/08/2017 3.7 1.6 - 8.3 10e9/L Final     Absolute Lymphocytes   Date Value Ref Range Status   02/08/2017 1.9 0.8 - 5.3 10e9/L Final     Absolute Monocytes   Date Value Ref Range Status   02/08/2017 0.5 0.0 - 1.3 10e9/L Final     Absolute Eosinophils   Date Value Ref Range Status   02/08/2017 0.2 0.0 - 0.7 10e9/L Final     Hemoglobin   Date Value Ref Range Status   02/08/2017 13.8 13.3 - 17.7 g/dL Final     Hematocrit   Date Value Ref Range Status   02/08/2017 42.0 40.0 - 53.0 % Final     Platelet Count   Date Value Ref Range Status   02/08/2017 225 150 - 450 10e9/L Final       Hepatitis B Testing   No lab results found.    Hepatitis C Testing     Hepatitis C Antibody   Date Value Ref Range Status    2017  NR Final    Nonreactive   Assay performance characteristics have not been established for newborns,   infants, and children       Imaging:  Recent Results (from the past 744 hour(s))   CT Chest Lung Cancer Scrn Low Dose wo    Narrative    CT Low Dose Lung Cancer Screening    History: Screening for lung cancer, smoking.    Number of packs-year of smokin  Current or Former smoker?: Former  if former, number of years since quit?: 2    Comparison: None available    Technique: Helical  acquisition Low dose CT chest. Images reviewed in  lung, soft tissue and bone windows.  DLP: 72 (mGy*cm)  CTDIvol: 1.67 (mGy)    Findings: Numerous scattered bilateral pulmonary nodules, the largest  3 of which are as follows:    - 6 x 5 mm solid nodule in the left upper lobe on series:  2 image:   223.    - 4 x 3 mm solid nodule in the left lower lobe on series:  2 image:   323.    - 4 x 3 mm solid nodule in the right lower lobe on series:  2 image:   339.    Multiple average diameter 5 mm pulmonary nodules along the right minor  fissure are most consistent with pulmonary lymph nodes.    Scattered benign eccentric calcified pulmonary nodules are also noted.    Emphysema: Moderate to severe centrilobular emphysema without  destructive or confluent changes.    Mosaic attenuation: Not present.    Bronchial wall thickening: Not present.    Bronchiectasis: Not present    Coronary artery calcium: Trace coronary artery calcification.    Other portions of the chest: Normal heart size. No pericardial  effusion. Normal caliber thoracic aorta and main pulmonary artery. No  adenopathy in the chest. No pleural effusion or pneumothorax.    Upper abdomen: Limited evaluation of the upper abdomen is unremarkable    Bones: No acute or aggressive appearing osseous lesion.      Impression    Impression:   1. ACR Assessment Category:  Lung-RADS Category 2. Benign appearance  or behavior.    Recommendation:  Lung-RADS Category 2. Benign  "appearance or behavior.  Recommendation:  continue annual screening.    2. Significant Incidental Finding(s):  Category S: No.  a.  Moderate to severe centrilobular emphysema without confluent  destructive changes.    3. Prior history of Lung cancer:  Category C: no.  4. Avoidance of tobacco smoke is strongly advised. Please consider  referral for smoking cessation to [facility]  (MTM) if clinically appropriate.      Download the \"LungRADS Assessment Categories\" table at this site:   http://[email or web address].org/Quality-Safety/Resources/LungRADS    I have personally reviewed the examination and initial interpretation  and I agree with the findings.    BARBARA KENNEDY MD             Again, thank you for allowing me to participate in the care of your patient.      Sincerely,    Gail Kirkland MD      "

## 2017-02-20 NOTE — PROGRESS NOTES
[facility] - HIV Initiation of Care  Dr. Gail Kirkland, [facility], [facility], [address] [place] [address], Sixth Floor, [address]  [place] [address]  Patient:  Leighton Randall, Date of birth 1945, Medical record number [id number]  Date of Visit:  02/20/2017         Assessment and Recommendations:     Human immunodeficiency virus (HIV) disease (H)  Continue Tivicay and Descovy. 2/8/17 - Virally suppressed, CD4 469. Follow up in 6 months.     Preventative Medicine  Cardiovascular Jonathan -   Checked 5/2016, LDL - 74, Total Chol 118   Blood Pressure - 154/82 today, will monitor  Cancer Screening   Colon - Followed by [facility]   Anal - 6/2009 - Negative for malignant cells. Anal-rectal transitional zone component not present  Immunizations   Hepatitis A - Reactive 8/23/2006   Hepatitis B - HepB Surface Ab + on 8/23/2006   Influenza - 10/13/2016   Pneumovax/Prevnar - Up to date. PCV23 - 2/7/2008. PCV13 - 5/3/2013   Tdap - Up to date - 5/3/13  Bone Health - No screening indicated at this time  Renal Health  Sexually Transmitted Infection Risk and Screening   Gonorrhea and Chlamydia   Syphilis    Gail Kirkland MD  Division of Infectious Diseases and International Medicine  [phone]         History of Infectious Disease Illness:   Mr. Carlson is a patient who has previously been seen at [facility] but is transferring care to the [facility].  Has been well controlled on his current regimen of Tivicay and Descovy and he reports that he has been tolerating it well with a high level of adherence. No nausea/ vomiting/ diarrhea.  No fevers or chills.  No cough or chest pain. He has had a difficult couple years with respect to several other medical issues. Specifically, he had an appendectomy in the 1970's that was complicated by adhesions needing multiple surgeries.  He required  mesh placement for anterior hernia repair and in 2015 developed a mesh infection that required removal of the mesh and bowel resection. He also underwent treatment for SIBO (seen at [facility]). He is feeling a little out of shape with the season.  In the summer he spend a lot of time gardening.         HIV History:   Date of Diagnosis: 1989  Approximated time of transmission: 1989, caught in acute retroviral syndrome  CD4 Lenin: 125  Viral Load at Diagnosis:   Risk Factors:   Opportunistic Infections:  CMV Status:  Toxo Status:  HLA  Status: negative  Tuberculosis Screening:  Historical use of ARVs: Complete ART history unknown, but has included: DDI, 3TC, Tenofovir, Kaletra, AZT monotherapy, On Truvada + Kaletra since at least 2007. Switched to Truvada + Dolutegravir 12/18/2014 for treatment simplification. Switched to Descovy + Dolutegravir 10/13/2016 for reduced pill size and better safety profile.         Past Medical and Surgical History:     AIDS (acquired immune deficiency syndrome) (**) 5/25/2007   Diagnosis 1989.     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 Stable nodule at last exam early 2007. Yearly follow-up recommended.     Tobacco dependence 5/25/2007         Family History:     Glaucoma Mother       Diabetes Sister            Social History:     Social History   Substance Use Topics     Smoking status: Former Smoker     Types: Cigarettes     Smokeless tobacco: Never Used      Comment: Stopped two years ago     Alcohol use Not on file     Social History     Social History Narrative            Review of Systems:   Answers for HPI/ROS submitted by the patient on 2/18/2017   General Symptoms:  Yes  Skin Symptoms: No  HENT Symptoms: No  EYE SYMPTOMS: Yes  HEART SYMPTOMS: No  LUNG SYMPTOMS: No  INTESTINAL SYMPTOMS: Yes  URINARY SYMPTOMS: Yes  REPRODUCTIVE SYMPTOMS: No  SKELETAL SYMPTOMS: No  BLOOD SYMPTOMS: No  NERVOUS SYSTEM SYMPTOMS: Yes  MENTAL HEALTH SYMPTOMS: Yes  Fever: No  Loss of appetite: No  Weight loss: No  Weight gain: No  Fatigue: Yes  Night sweats: No  Chills: No  Increased stress: No  Excessive hunger: Yes  Excessive thirst: No  Feeling hot or cold when others believe the temperature is normal: Yes  Loss of height: No  Post-operative complications: Yes  Surgical site pain: Yes  Hallucinations: No  Change in or Loss of Energy: Yes  Hyperactivity: No  Confusion: No  Eye pain: Yes  Vision loss: No  Dry eyes: No  Watery eyes: No  Eye bulging: No  Double vision: No  Flashing of lights: No  Spots: No  Floaters: Yes  Redness: No  Crossed eyes: No  Tunnel Vision: No  Yellowing of eyes: No  Eye irritation: Yes  Heart burn or indigestion: Yes  Nausea: Yes  Vomiting: No  Abdominal pain: Yes  Bloating: Yes  Constipation: Yes  Diarrhea: No  Blood in stool: No  Black stools: No  Rectal or Anal pain: No  Fecal incontinence: No  Rectal bleeding: No  Yellowing of skin or eyes: No  Vomit with blood: No  Change in stools: Yes  Hemorrhoids: Yes  Trouble holding urine or incontinence: No  Pain or burning: No  Trouble starting or stopping: Yes  Increased frequency of urination: No  Blood in urine: No  Decreased frequency of urination: No  Frequent nighttime urination: No  Flank pain: No  Difficulty emptying bladder: Yes  Trouble with coordination: No  Dizziness or trouble with balance: No  Fainting or black-out spells: No  Memory loss: No  Headache: Yes  Seizures: No  Speech problems: No  Tingling: No  Tremor: No  Weakness: No  Difficulty walking: No  Paralysis: No  Numbness: No  Nervous or Anxious: No  Depression: No  Trouble sleeping: Yes  Trouble thinking or concentrating: Yes  Mood changes: No  Panic attacks:  "No           Current Medications:     Current Outpatient Prescriptions   Medication Sig Dispense Refill     Emtricitabine-Tenofovir AF (DESCOVY PO) Take 1 tablet by mouth daily       dolutegravir (TIVICAY) 50 MG tablet Take 50 mg by mouth daily       DiphenhydrAMINE HCl (BENADRYL PO) Take 25 mg by mouth as needed       cetirizine (ZYRTEC) 10 MG tablet Take 10 mg by mouth daily as needed for allergies       Acetaminophen (TYLENOL PO) Take 325 mg by mouth as needed for mild pain or fever       loratadine (CLARITIN) 10 MG tablet Take 10 mg by mouth as needed for allergies       pramipexole (MIRAPEX) 0.5 MG tablet Take by mouth daily       timolol (TIMOPTIC) 0.5 % ophthalmic solution Place 1 drop Into the left eye 2 times daily       latanoprost (XALATAN) 0.005 % ophthalmic solution Place 1 drop into both eyes At Bedtime       OMEPRAZOLE PO Take 20 mg by mouth              Immunization History:     Immunization History   Administered Date(s) Administered     Influenza (High Dose) 3 valent vaccine 10/13/2016     Pneumococcal 23 valent 02/07/2008     TDAP (BOOSTRIX AGES 10-64) 05/13/2013            Allergies:     Allergies   Allergen Reactions     Amoxicillin      Hives     Wellbutrin [Bupropion]      Rash, hives            Physical Exam:   Vital signs:  /82  Pulse 80  Temp 98.2  F (36.8  C) (Oral)  Ht 1.74 m (5' 8.5\")  Wt 69.4 kg (153 lb)  BMI 22.93 kg/m2    Physical Examination:  GENERAL:  well-developed, well-nourished, seated in no acute distress.  HEENT:  Head is normocephalic, atraumatic   EYES:  Eyes have anicteric sclerae without conjunctival injection   ENT:  Oropharynx is moist without exudates or ulcers. Tongue is midline  NECK:  Supple. No cervical lymphadenopathy  LUNGS:  Clear to auscultation bilateral.   CARDIOVASCULAR:  Regular rate and rhythm with no murmurs, gallops or rubs.  ABDOMEN:  Normal bowel sounds, soft, nontender. Well healed complex anterior abdominal scar.   SKIN:  No acute rashes.  "   NEUROLOGIC:  Grossly nonfocal. Active x4 extremities         Laboratory Data:     CD4 Count  Absolute CD4   Date Value Ref Range Status   02/08/2017 469 441 - 2156 cells/uL Final     CD4 [place] T   Date Value Ref Range Status   02/08/2017 24 (L) 28 - 63 % Final     CD4:CD8 Ratio   Date Value Ref Range Status   02/08/2017 0.41 (L) 1.40 - 2.60 Final       HIV-1 RNA Quantitative:  HIV-1 RNA Quant Result   Date Value Ref Range Status   02/08/2017  HIVND [Copies]/mL Final    HIV-1 RNA Not Detected   The RUBA AmpliPrep/RUBA TaqMan HIV-1 test is an FDA-approved in vitro nucleic   acid amplification test for the quantitation of HIV-1 RNA in human plasma (EDTA   plasma) using the RUBA AmpliPrep instrument for automated viral nucleic acid   extraction and the RUBA TaqMan Analyzer or Linux Voice TaqMan for automated Real   Time PCR amplification and detection of the viral nucleic acid target.   Titer results are reported in copies/ml. This assay is intended for use in   conjunction with clinical presentation and other laboratory markers of disease   prognosis and for use as an aid in assessing viral response to antiretroviral   treatment as measured by changes in plasma HIV-1 RNA levels. This test should   not be used as a donor screening test to confirm the presence of HIV-1   infection.         Metabolic Studies       Sodium   Date Value Ref Range Status   02/08/2017 141 133 - 144 mmol/L Final     Potassium   Date Value Ref Range Status   02/08/2017 4.6 3.4 - 5.3 mmol/L Final     Chloride   Date Value Ref Range Status   02/08/2017 105 94 - 109 mmol/L Final     Carbon Dioxide   Date Value Ref Range Status   02/08/2017 30 20 - 32 mmol/L Final     Anion Gap   Date Value Ref Range Status   02/08/2017 6 3 - 14 mmol/L Final     Urea Nitrogen   Date Value Ref Range Status   02/08/2017 16 7 - 30 mg/dL Final     Creatinine   Date Value Ref Range Status   02/08/2017 0.89 0.66 - 1.25 mg/dL Final     GFR Estimate   Date Value Ref Range  Status   02/08/2017 84 >60 mL/min/1.7m2 Final     Comment:     Non  GFR Calc     Glucose   Date Value Ref Range Status   02/08/2017 102 (H) 70 - 99 mg/dL Final     Hemoglobin A1C   Date Value Ref Range Status   02/17/2017 5.2 4.3 - 6.0 % Final     Calcium   Date Value Ref Range Status   02/08/2017 8.9 8.5 - 10.1 mg/dL Final       Hepatic Studies    Bilirubin Total   Date Value Ref Range Status   02/08/2017 0.5 0.2 - 1.3 mg/dL Final     Alkaline Phosphatase   Date Value Ref Range Status   02/08/2017 124 40 - 150 U/L Final     Albumin   Date Value Ref Range Status   02/08/2017 3.9 3.4 - 5.0 g/dL Final     AST   Date Value Ref Range Status   02/08/2017 18 0 - 45 U/L Final     ALT   Date Value Ref Range Status   02/08/2017 30 0 - 70 U/L Final       Hematology Studies      WBC   Date Value Ref Range Status   02/08/2017 6.3 4.0 - 11.0 10e9/L Final     Absolute Neutrophil   Date Value Ref Range Status   02/08/2017 3.7 1.6 - 8.3 10e9/L Final     Absolute Lymphocytes   Date Value Ref Range Status   02/08/2017 1.9 0.8 - 5.3 10e9/L Final     Absolute Monocytes   Date Value Ref Range Status   02/08/2017 0.5 0.0 - 1.3 10e9/L Final     Absolute Eosinophils   Date Value Ref Range Status   02/08/2017 0.2 0.0 - 0.7 10e9/L Final     Hemoglobin   Date Value Ref Range Status   02/08/2017 13.8 13.3 - 17.7 g/dL Final     Hematocrit   Date Value Ref Range Status   02/08/2017 42.0 40.0 - 53.0 % Final     Platelet Count   Date Value Ref Range Status   02/08/2017 225 150 - 450 10e9/L Final       Hepatitis B Testing   No lab results found.    Hepatitis C Testing     Hepatitis C Antibody   Date Value Ref Range Status   02/17/2017  NR Final    Nonreactive   Assay performance characteristics have not been established for newborns,   infants, and children       Imaging:  Recent Results (from the past 744 hour(s))   CT Chest Lung Cancer Scrn Low Dose wo    Narrative    CT Low Dose Lung Cancer Screening    History: Screening for  lung cancer, smoking.    Number of packs-year of smokin  Current or Former smoker?: Former  if former, number of years since quit?: 2    Comparison: None available    Technique: Helical  acquisition Low dose CT chest. Images reviewed in  lung, soft tissue and bone windows.  DLP: 72 (mGy*cm)  CTDIvol: 1.67 (mGy)    Findings: Numerous scattered bilateral pulmonary nodules, the largest  3 of which are as follows:    - 6 x 5 mm solid nodule in the left upper lobe on series:  2 image:   223.    - 4 x 3 mm solid nodule in the left lower lobe on series:  2 image:   323.    - 4 x 3 mm solid nodule in the right lower lobe on series:  2 image:   339.    Multiple average diameter 5 mm pulmonary nodules along the right minor  fissure are most consistent with pulmonary lymph nodes.    Scattered benign eccentric calcified pulmonary nodules are also noted.    Emphysema: Moderate to severe centrilobular emphysema without  destructive or confluent changes.    Mosaic attenuation: Not present.    Bronchial wall thickening: Not present.    Bronchiectasis: Not present    Coronary artery calcium: Trace coronary artery calcification.    Other portions of the chest: Normal heart size. No pericardial  effusion. Normal caliber thoracic aorta and main pulmonary artery. No  adenopathy in the chest. No pleural effusion or pneumothorax.    Upper abdomen: Limited evaluation of the upper abdomen is unremarkable    Bones: No acute or aggressive appearing osseous lesion.      Impression    Impression:   1. ACR Assessment Category:  Lung-RADS Category 2. Benign appearance  or behavior.    Recommendation:  Lung-RADS Category 2. Benign appearance or behavior.  Recommendation:  continue annual screening.    2. Significant Incidental Finding(s):  Category S: No.  a.  Moderate to severe centrilobular emphysema without confluent  destructive changes.    3. Prior history of Lung cancer:  Category C: no.  4. Avoidance of tobacco smoke is strongly  "advised. Please consider  referral for smoking cessation to [facility] Medication Therapy Management  (MTM) if clinically appropriate.      Download the \"LungRADS Assessment Categories\" table at this site:   http://www.acr.org/Quality-Safety/Resources/LungRADS    I have personally reviewed the examination and initial interpretation  and I agree with the findings.    BARBARA KENNEDY MD           "

## 2017-02-20 NOTE — MR AVS SNAPSHOT
After Visit Summary   2/20/2017    Leighton Randall    MRN: [id number]           Patient Information     Date Of Birth          1945        Visit Information        Provider Department      2/20/2017 8:30 AM Gail Kirkland MD [facility] and Infectious Diseases        Today's Diagnoses     Human immunodeficiency virus (HIV) disease (H)           Follow-ups after your visit        Your next 10 appointments already scheduled     Mar 21, 2017  1:45 PM CDT   (Arrive by 1:30 PM)   New Patient Visit with Nahum Boo MD   [facility] ([facility])    [address] [place] [id number] [phone]            Aug 17, 2017  8:00 AM CDT   (Arrive by 7:45 AM)   Return Visit with Gail Kirkland MD   [facility] and Infectious Diseases ([facility])    [address] [place] [id number] [phone]              Who to contact     If you have questions or need follow up information about today's clinic visit or your schedule please contact [facility] AND INFECTIOUS DISEASES directly at [phone].  Normal or non-critical lab and imaging results will be communicated to you by MyChart, letter or phone within 4 business days after the clinic has received the results. If you do not hear from us within 7 days, please contact the clinic through MyChart or phone. If you have a critical or abnormal lab result, we will notify you by phone as soon as possible.  Submit refill requests through [employer] or call your pharmacy and they will forward the refill request to us. Please allow 3 business days for your refill to be completed.          Additional Information About Your Visit        [facility] Information     [employer] gives you secure access to your electronic health record. If you see a primary care provider, you can also send  "messages to your care team and make appointments. If you have questions, please call your primary care clinic.  If you do not have a primary care provider, please call [phone] and they will assist you.        Care EveryWhere ID     This is your Care EveryWhere ID. This could be used by other organizations to access your [facility] medical records  [id number]        Your Vitals Were     Pulse Temperature Height BMI (Body Mass Index)          80 98.2  F (36.8  C) (Oral) 1.74 m (5' 8.5\") 22.93 kg/m2         Blood Pressure from Last 3 Encounters:   02/20/17 154/82   02/17/17 130/76    Weight from Last 3 Encounters:   02/20/17 69.4 kg (153 lb)   02/17/17 68 kg (150 lb)              Today, you had the following     No orders found for display       Primary Care Provider Office Phone # Fax #    Digna Tay [phone]       [address] [address]        Thank you!     Thank you for choosing [facility] AND INFECTIOUS DISEASES  for your care. Our goal is always to provide you with excellent care. Hearing back from our patients is one way we can continue to improve our services. Please take a few minutes to complete the written survey that you may receive in the mail after your visit with us. Thank you!             Your Updated Medication List - Protect others around you: Learn how to safely use, store and throw away your medicines at www.disposemymeds.org.          This list is accurate as of: 2/20/17 11:59 PM.  Always use your most recent med list.                   Brand Name Dispense Instructions for use    BENADRYL PO      Take 25 mg by mouth as needed       cetirizine 10 MG tablet    zyrTEC     Take 10 mg by mouth daily as needed for allergies       DESCOVY PO      Take 1 tablet by mouth daily       dolutegravir 50 MG tablet    TIVICAY     Take 50 mg by mouth daily       loratadine 10 MG tablet    CLARITIN     Take 10 mg by mouth as " needed for allergies       OMEPRAZOLE PO      Take 20 mg by mouth       pramipexole 0.5 MG tablet    MIRAPEX     Take by mouth daily       timolol 0.5 % ophthalmic solution    TIMOPTIC     Place 1 drop Into the left eye 2 times daily       TYLENOL PO      Take 325 mg by mouth as needed for mild pain or fever       XALATAN 0.005 % ophthalmic solution   Generic drug:  latanoprost      Place 1 drop into both eyes At Bedtime

## 2017-03-21 ENCOUNTER — OFFICE VISIT (OUTPATIENT)
Dept: DERMATOLOGY | Facility: CLINIC | Age: 72
End: 2017-03-21

## 2017-03-21 DIAGNOSIS — D23.9 DERMATOFIBROMA: ICD-10-CM

## 2017-03-21 DIAGNOSIS — Z12.83 SCREENING EXAM FOR SKIN CANCER: ICD-10-CM

## 2017-03-21 DIAGNOSIS — D18.01 CHERRY ANGIOMA: ICD-10-CM

## 2017-03-21 DIAGNOSIS — L82.1 SK (SEBORRHEIC KERATOSIS): ICD-10-CM

## 2017-03-21 DIAGNOSIS — L81.4 SOLAR LENTIGO: ICD-10-CM

## 2017-03-21 DIAGNOSIS — D22.9 MULTIPLE BENIGN MELANOCYTIC NEVI: Primary | ICD-10-CM

## 2017-03-21 ASSESSMENT — PAIN SCALES - GENERAL: PAINLEVEL: NO PAIN (0)

## 2017-03-21 NOTE — NURSING NOTE
Dermatology Rooming Note    Leighton Randall's goals for this visit include:   Chief Complaint   Patient presents with     Derm Problem     Leighton is here today for a skin check.      Adriana Valenzuela MA

## 2017-03-21 NOTE — PROGRESS NOTES
[facility] Dermatology Note    Dermatology Problem List:  1. Dermatofibroma - right shin  2. Nevi  3. Solar lentigines    CC:   Chief Complaint   Patient presents with     Derm Problem     Leighton is here today for a skin check.      Date of Service: Mar 21, 2017    History of Present Illness:  Mr. Leighton Randall is a 71 year old male who presents as a referral by Dr. Tay for a skin check. Today the patient reports that Dr. Tay had a lesion of concern on the right earlobe and another on the left upper back. The spot on his ear has been there forever. He also reports a spot of concern on his leg that fluctuates in size. He notes that he had a hernia mesh that was grown into the bowels and he had to have some parts of the bowels removed. He states that this was done about a year ago and is slowly healing. The patient reports no other lesions of concern at this time.    Otherwise, the patient reports no painful, bleeding, nonhealing, or pruritic lesions, and denies new or changing moles.    Past Medical History:   Patient Active Problem List   Diagnosis     Human immunodeficiency virus (HIV) disease (H)     History of colonic polyps     Laryngeal cancer (H)     History reviewed. No pertinent past medical history.  History reviewed. No pertinent past surgical history.     Social History:  The patient has had a blistering sunburn when he was younger. He denies the use of tanning beds. He also does not wear sunscreen very often.     Family History:  No family history of tanning bed.     Medications:  Current Outpatient Prescriptions   Medication Sig Dispense Refill     Emtricitabine-Tenofovir AF (DESCOVY PO) Take 1 tablet by mouth daily       dolutegravir (TIVICAY) 50 MG tablet Take 50 mg by mouth daily       DiphenhydrAMINE HCl (BENADRYL PO) Take 25 mg by mouth as needed       cetirizine (ZYRTEC) 10 MG tablet Take 10 mg by mouth daily as needed for allergies       Acetaminophen (TYLENOL PO)  Take 325 mg by mouth as needed for mild pain or fever       loratadine (CLARITIN) 10 MG tablet Take 10 mg by mouth as needed for allergies       pramipexole (MIRAPEX) 0.5 MG tablet Take by mouth daily       timolol (TIMOPTIC) 0.5 % ophthalmic solution Place 1 drop Into the left eye 2 times daily       latanoprost (XALATAN) 0.005 % ophthalmic solution Place 1 drop into both eyes At Bedtime       OMEPRAZOLE PO Take 20 mg by mouth       Allergies:  Allergies   Allergen Reactions     Amoxicillin      Hives     Wellbutrin [Bupropion]      Rash, hives     Varenicline Nausea     Review of Systems:  - Skin: As above in HPI. No additional skin concerns.    Physical exam:  Vitals: There were no vitals taken for this visit.  GEN: This is a well developed, well-nourished male in no acute distress, in a pleasant mood.      SKIN: Total skin excluding the undergarment areas was performed. The exam included the head/face, neck, both arms, chest, back, abdomen, both legs, digits and/or nails.   - 3 mm white blueish papule with a central dark punctum behind the right ear  - Right upper earlobe near intertragic notch: 3 mm skin colored to spotted brown papule  - Brown macule on the right cheek  - Slight xerosis on the hands  - Multiple regular brown pigmented macules and papules with reticular networks are identified on the trunk and extremities.   - Largest nevus: 6 mm in total diameter and is notable for a uniform ret network on the right upper back  - 4 mm violaceous papule with hyperpigmented rim and central stellate scar like region on the right shin  - No other lesions of concern on areas examined.     Impression/Plan:  1. 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 The patient elects for clinical monitoring.    3. Solar lentigo - right cheek  - No further intervention required. Patient to report changes.     4.  Multiple clinically benign nevi - trunk, extremities  - No further intervention required. Patient to report changes.     5. Dermatofibroma - right shin  - No further intervention required. Patient to report changes.     Follow-up PRN.     Staff Involved:  Staff Only  Scribe Disclosure:   I, Adam Ellis, am serving as a scribe to document services personally performed by Dr. Nahum Boo, based on data collection and the provider's statements to me.     Staff attestation:  The documentation recorded by the scribe accurately reflects the services I personally performed and the decisions I personally made.    Nahum Boo MD  Staff Dermatologist    Department of Dermatology

## 2017-03-21 NOTE — LETTER
3/21/2017       RE: Leighton Randall  [address]  [facility] [address]     Dear Colleague,    Thank you for referring your patient, Leighton Randall, to the [facility] DERMATOLOGY at [facility]. Please see a copy of my visit note below.    [facility] Dermatology Note    Dermatology Problem List:  1. Dermatofibroma - right shin  2. Nevi  3. Solar lentigines    CC:   Chief Complaint   Patient presents with     Derm Problem     Leighton is here today for a skin check.      Date of Service: Mar 21, 2017    History of Present Illness:  Mr. Leighton Randall is a 71 year old male who presents as a referral by Dr. Tay for a skin check. Today the patient reports that Dr. Tay had a lesion of concern on the right earlobe and another on the left upper back. The spot on his ear has been there forever. He also reports a spot of concern on his leg that fluctuates in size. He notes that he had a hernia mesh that was grown into the bowels and he had to have some parts of the bowels removed. He states that this was done about a year ago and is slowly healing. The patient reports no other lesions of concern at this time.    Otherwise, the patient reports no painful, bleeding, nonhealing, or pruritic lesions, and denies new or changing moles.    Past Medical History:   Patient Active Problem List   Diagnosis     Human immunodeficiency virus (HIV) disease (H)     History of colonic polyps     Laryngeal cancer (H)     History reviewed. No pertinent past medical history.  History reviewed. No pertinent past surgical history.     Social History:  The patient has had a blistering sunburn when he was younger. He denies the use of tanning beds. He also does not wear sunscreen very often.     Family History:  No family history of tanning bed.     Medications:  Current Outpatient Prescriptions   Medication Sig Dispense Refill     Emtricitabine-Tenofovir AF (DESCOVY  PO) Take 1 tablet by mouth daily       dolutegravir (TIVICAY) 50 MG tablet Take 50 mg by mouth daily       DiphenhydrAMINE HCl (BENADRYL PO) Take 25 mg by mouth as needed       cetirizine (ZYRTEC) 10 MG tablet Take 10 mg by mouth daily as needed for allergies       Acetaminophen (TYLENOL PO) Take 325 mg by mouth as needed for mild pain or fever       loratadine (CLARITIN) 10 MG tablet Take 10 mg by mouth as needed for allergies       pramipexole (MIRAPEX) 0.5 MG tablet Take by mouth daily       timolol (TIMOPTIC) 0.5 % ophthalmic solution Place 1 drop Into the left eye 2 times daily       latanoprost (XALATAN) 0.005 % ophthalmic solution Place 1 drop into both eyes At Bedtime       OMEPRAZOLE PO Take 20 mg by mouth       Allergies:  Allergies   Allergen Reactions     Amoxicillin      Hives     Wellbutrin [Bupropion]      Rash, hives     Varenicline Nausea     Review of Systems:  - Skin: As above in HPI. No additional skin concerns.    Physical exam:  Vitals: There were no vitals taken for this visit.  GEN: This is a well developed, well-nourished male in no acute distress, in a pleasant mood.      SKIN: Total skin excluding the undergarment areas was performed. The exam included the head/face, neck, both arms, chest, back, abdomen, both legs, digits and/or nails.   - 3 mm white blueish papule with a central dark punctum behind the right ear  - Right upper earlobe near intertragic notch: 3 mm skin colored to spotted brown papule  - Brown macule on the right cheek  - Slight xerosis on the hands  - Multiple regular brown pigmented macules and papules with reticular networks are identified on the trunk and extremities.   - Largest nevus: 6 mm in total diameter and is notable for a uniform ret network on the right upper back  - 4 mm violaceous papule with hyperpigmented rim and central stellate scar like region on the right shin  - No other lesions of concern on areas examined.     Impression/Plan:  1. Pityriasis  simplex  - Recommend head and shoulders shampoo    2. 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 The patient elects for clinical monitoring.    3. Solar lentigo - right cheek  - No further intervention required. Patient to report changes.     4. Multiple clinically benign nevi - trunk, extremities  - No further intervention required. Patient to report changes.     5. Dermatofibroma - right shin  - No further intervention required. Patient to report changes.       Follow-up prn.     Staff Involved:  Staff Only  Scribe Disclosure:   I, Adam Ellis, am serving as a scribe to document services personally performed by Dr. Nahum Boo, based on data collection and the provider's statements to me.     Again, thank you for allowing me to participate in the care of your patient.      Sincerely,    Nahum Boo MD

## 2017-03-21 NOTE — MR AVS SNAPSHOT
After Visit Summary   3/21/2017    Leighton Randall    MRN: [id number]           Patient Information     Date Of Birth          1945        Visit Information        Provider Department      3/21/2017 1:45 PM Nahum Boo MD [facility] Dermatology        Today's Diagnoses     Multiple benign melanocytic nevi    -  1    Screening exam for skin cancer        Solar lentigo        Cherry angioma        SK (seborrheic keratosis)        Dermatofibroma           Follow-ups after your visit        Follow-up notes from your care team     Return if symptoms worsen or fail to improve.      Your next 10 appointments already scheduled     Aug 17, 2017  8:00 AM CDT   (Arrive by 7:45 AM)   Return Visit with Gail Kirkland MD   [facility] and Infectious Diseases ([facility])    [address] [place] [address] [phone]              Who to contact     Please call your clinic at [phone] to:    Ask questions about your health    Make or cancel appointments    Discuss your medicines    Learn about your test results    Speak to your doctor   If you have compliments or concerns about an experience at your clinic, or if you wish to file a complaint, please contact [facility] Patient Relations at [phone] or email us at [email or web address]         Additional Information About Your Visit        MyChart Information     [employer] gives you secure access to your electronic health record. If you see a primary care provider, you can also send messages to your care team and make appointments. If you have questions, please call your primary care clinic.  If you do not have a primary care provider, please call [phone] and they will assist you.      [employer] is an electronic gateway that provides easy, online access to your medical records. With [employer], you can request a clinic appointment,  read your test results, renew a prescription or communicate with your care team.     To access your existing account, please contact your [facility] or call [phone] for assistance.        Care EveryWhere ID     This is your Care EveryWhere ID. This could be used by other organizations to access your [facility] medical records  [id number]         Blood Pressure from Last 3 Encounters:   No data found for BP    Weight from Last 3 Encounters:   No data found for Wt              Today, you had the following     No orders found for display       Primary Care Provider Office Phone # Fax #    Digna Tay [phone]       [place] [address] [address] [address]        Thank you!     Thank you for choosing [facility] DERMATOLOGY  for your care. Our goal is always to provide you with excellent care. Hearing back from our patients is one way we can continue to improve our services. Please take a few minutes to complete the written survey that you may receive in the mail after your visit with us. Thank you!             Your Updated Medication List - Protect others around you: Learn how to safely use, store and throw away your medicines at www.disposemymeds.org.          This list is accurate as of: 3/21/17 11:59 PM.  Always use your most recent med list.                   Brand Name Dispense Instructions for use    BENADRYL PO      Take 25 mg by mouth as needed       cetirizine 10 MG tablet    zyrTEC     Take 10 mg by mouth daily as needed for allergies       DESCOVY PO      Take 1 tablet by mouth daily       dolutegravir 50 MG tablet    TIVICAY     Take 50 mg by mouth daily       loratadine 10 MG tablet    CLARITIN     Take 10 mg by mouth as needed for allergies       OMEPRAZOLE PO      Take 20 mg by mouth       pramipexole 0.5 MG tablet    MIRAPEX     Take by mouth daily       timolol 0.5 % ophthalmic solution    TIMOPTIC     Place 1 drop Into  the left eye 2 times daily       TYLENOL PO      Take 325 mg by mouth as needed for mild pain or fever       XALATAN 0.005 % ophthalmic solution   Generic drug:  latanoprost      Place 1 drop into both eyes At Bedtime

## 2017-04-04 ENCOUNTER — DOCUMENTATION ONLY (OUTPATIENT)
Dept: FAMILY MEDICINE | Facility: CLINIC | Age: 72
End: 2017-04-04

## 2017-04-06 NOTE — PROGRESS NOTES
Visit to the client's home for initial health risk assessment.  An  was not needed.    Current situation/living environment/hospitalization: Luther lives in a single family house with his roommate.  He was fully dressed and well groomed.  Living spaces was clean and very neat.  In the past year he has been in the hospital once for bowel resection.    Activities of daily living (ADL)/instrumental activities of daily living (IADL) and functional issues: Luther is independent in his ADLs and IADLs.  He is up independently and does not use cane or walker.  He reports no falls.  He said he is able to cook, clean and do his own laundry.  He is also able to dress, bathe and groom himself.  He is not incontinent.  He is able to manage his meds and finances.      Health concerns/updates: Luther was recently in the hospital for bowel resection and hernia mesh removal about a year ago.  The was then transferred to TCU for about a week.  Today he said he feels much better. He reports no pain.   He did report some lesions on his ear and back and is using special shampoo on this.  His last dental check up was 2015 and had an appt at the end of this month.  He wears glasses and had his last eye check up last month.  He is UTD with immunizations.      Cognition/mental health: Pleasant and cheerful during our visit.  He is an active toth and said he started [employer] which is not too far from his house.  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    Client's Plan of Care consists of:  No services/equipment needed at this time

## 2017-04-27 ENCOUNTER — HOSPITAL ENCOUNTER (EMERGENCY)
Facility: CLINIC | Age: 72
Discharge: HOME | End: 2017-04-27
Attending: EMERGENCY MEDICINE | Admitting: EMERGENCY MEDICINE
Payer: COMMERCIAL

## 2017-04-27 VITALS
OXYGEN SATURATION: 96 % | TEMPERATURE: 97.5 F | RESPIRATION RATE: 16 BRPM | DIASTOLIC BLOOD PRESSURE: 95 MMHG | HEIGHT: 69 IN | SYSTOLIC BLOOD PRESSURE: 134 MMHG | HEART RATE: 79 BPM

## 2017-04-27 DIAGNOSIS — S30.811A ABRASION OF ABDOMINAL WALL, INITIAL ENCOUNTER: ICD-10-CM

## 2017-04-27 PROCEDURE — 99283 EMERGENCY DEPT VISIT LOW MDM: CPT | Performed by: EMERGENCY MEDICINE

## 2017-04-27 PROCEDURE — 99283 EMERGENCY DEPT VISIT LOW MDM: CPT | Mod: Z6 | Performed by: EMERGENCY MEDICINE

## 2017-04-27 ASSESSMENT — ENCOUNTER SYMPTOMS
NAUSEA: 0
ADENOPATHY: 0
COUGH: 0
CHILLS: 0
POLYDIPSIA: 0
BRUISES/BLEEDS EASILY: 0
FEVER: 0
ABDOMINAL PAIN: 0
VOMITING: 0
WOUND: 1

## 2017-04-27 NOTE — ED PROVIDER NOTES
History     Chief Complaint   Patient presents with     Scar Management     red unusal looking scar from a sugery in 2016     HPI  Leighton Randall is a 71 year old male who presents for a wound check. Patient had infected mesh removed from his abdomen approximately one year ago. He had a prolonged recovery course and the wound VAC in place in his anterior abdominal wall. Yesterday he noticed an abrasion on the skin overlying the former wound.No fevers. No pain. No skin color changes on his abdomen. No other concerns or complaints.    I have reviewed the Medications, Allergies, Past Medical and Surgical History, and Social History in the Epic system.    Review of Systems   Constitutional: Negative for chills and fever.   Respiratory: Negative for cough.    Cardiovascular: Negative for chest pain.   Gastrointestinal: Negative for abdominal pain, nausea and vomiting.   Endocrine: Negative for polydipsia and polyuria.   Skin: Positive for wound.   Allergic/Immunologic: Positive for immunocompromised state.   Hematological: Negative for adenopathy. Does not bruise/bleed easily.   All other systems reviewed and are negative.      Physical Exam      Physical Exam   Constitutional: He is oriented to person, place, and time. He appears well-developed and well-nourished.   HENT:   Head: Normocephalic and atraumatic.   Eyes: Conjunctivae and EOM are normal.   Neck: Normal range of motion.   Cardiovascular: Normal rate.    Pulmonary/Chest: Effort normal. No respiratory distress.   Abdominal: Soft. He exhibits no distension. There is no tenderness. There is no rebound and no guarding.   Healed surgical wound in mid-abdomen with 0.5cm abrasion without bleeding. There is no evidence of erythema, induration, or fluctuance of the skin associated with the affected area.   Musculoskeletal: Normal range of motion.   Neurological: He is alert and oriented to person, place, and time. No cranial nerve deficit. He exhibits normal  muscle tone. Coordination normal.   Skin: Skin is warm. No rash noted. He is not diaphoretic. No erythema.   Healed surgical wound in mid-abdomen with 0.5cm abrasion without bleeding. There is no evidence of erythema, induration, or fluctuance of the skin associated with the affected area.   Psychiatric: He has a normal mood and affect. His behavior is normal. Judgment and thought content normal.   Nursing note and vitals reviewed.      ED Course     ED Course     Procedures             Critical Care time:  none               Labs Ordered and Resulted from Time of ED Arrival Up to the Time of Departure from the ED - No data to display         Assessments & Plan (with Medical Decision Making)   71-year-old man presenting for wound check. Differential diagnosis: abrasion, unlikely cellulitis, unlikely infection.    After thorough history and physical examination patient appears to be in no acute distress. There is a very small abrasion on the anterior abdominal wall where his wound used to be.There is no evidence of infection. I recommended follow up with wound clinic for further evaluation. He agrees with the plan.    I have reviewed the nursing notes.    I have reviewed the findings, diagnosis, plan and need for follow up with the patient.    Discharge Medication List as of 4/27/2017  8:58 AM          Final diagnoses:   Abrasion of abdominal wall, initial encounter       4/27/2017   [facility], [place], EMERGENCY DEPARTMENT     Meg Newman MD  04/27/17 3491

## 2017-04-27 NOTE — ED AVS SNAPSHOT
[facility], [place], Emergency Department    [address]    [facility] [address]    Phone:  [phone]    Fax:  [phone]                                       Leighton Randall   MRN: [id number]    Department:  [facility], Emergency Department   Date of Visit:  4/27/2017           After Visit Summary Signature Page     I have received my discharge instructions, and my questions have been answered. I have discussed any challenges I see with this plan with the nurse or doctor.    ..........................................................................................................................................  Patient/Patient Representative Signature      ..........................................................................................................................................  Patient Representative Print Name and Relationship to Patient    ..................................................               ................................................  Date                                            Time    ..........................................................................................................................................  Reviewed by Signature/Title    ...................................................              ..............................................  Date                                                            Time

## 2017-04-27 NOTE — ED AVS SNAPSHOT
[facility], Emergency Department    [address]    [place] [address]    Phone:  [phone]    Fax:  [phone]                                       Leighton Randall   MRN: [id number]    Department:  [facility], Emergency Department   Date of Visit:  4/27/2017           Patient Information     Date Of Birth          1945        Your diagnoses for this visit were:     Abrasion of abdominal wall, initial encounter        You were seen by Meg Newman MD.        Discharge Instructions       Please make an appointment to follow up with Your Wound Care Specialist through [facility] for further evaluation and recommendations. There is no evidence of skin infection on your abdominal wall. If your symptoms significantly worsen please seek immediate medical attention.    Abrasions  Abrasions are skin scrapes. Their treatment depends on how large and deep the abrasion is.  Home care   You may be prescribed an antibiotic cream or ointment to apply to the wound. This helps prevent infection. Follow instructions when using this medication.  General care    To care for the abrasion, do the following each day for as long as directed by your health care provider.    If you were given a bandage, change it once a day. If your bandage sticks to the wound, soak it in warm water until it loosens.    Wash the area with soap and warm water. You may do this in a sink or under a tub faucet or shower. Rinse off the soap. Then pat the area dry with a clean towel.    If antibiotic ointment or cream was prescribed, reapply it to the wound as directed. Cover the wound with a fresh non-stick bandage. If the bandage becomes wet or dirty, change it as soon as possible.    You may use acetaminophen or ibuprofen to control pain unless another pain medication was prescribed. Note: If you have chronic liver or kidney disease or ever had a stomach ulcer or GI bleeding, talk with your health care  provider before using these medications. Do not use ibuprofen in children under six months of age.    Most skin wounds heal within ten days. But an infection may occur despite treatment. Therefore, monitor the wound for signs of infection as listed below.  Follow-up care  Follow up with your health care provider, or as advised.  When to seek medical advice  Call your health care provider right away if any of these occur:    Fever of 101 F (38.3 C) or higher, or as directed by your health care provider    Increasing pain, redness, swelling, or drainage from the wound    Bleeding from the wound that does not stop after a few minutes of steady, firm pressure    Decreased ability to move any body part near wound    0488-0583 [employer]. [address] [place] [address], [place] [address]. All rights reserved. This information is not intended as a substitute for professional medical care. Always follow your healthcare professional's instructions.          Future Appointments        Provider Department Dept Phone Center    8/17/2017 8:00 AM Gail Kirkland MD [facility] and Infectious Diseases [phone] [facility]      24 Hour Appointment Hotline       To make an appointment at any [facility], call [phone] ([phone]). If you don't have a family doctor or clinic, we will help you find one. [facility] clinics are conveniently located to serve the needs of you and your family.             Review of your medicines      Our records show that you are taking the medicines listed below. If these are incorrect, please call your family doctor or clinic.        Dose / Directions Last dose taken    BENADRYL PO   Dose:  25 mg        Take 25 mg by mouth as needed   Refills:  0        cetirizine 10 MG tablet   Commonly known as:  zyrTEC   Dose:  10 mg        Take 10 mg by mouth daily as needed for allergies   Refills:  0        DESCOVY PO   Dose:  1 tablet        Take 1 tablet by mouth daily    Refills:  0        dolutegravir 50 MG tablet   Commonly known as:  TIVICAY   Dose:  50 mg        Take 50 mg by mouth daily   Refills:  0        loratadine 10 MG tablet   Commonly known as:  CLARITIN   Dose:  10 mg        Take 10 mg by mouth as needed for allergies   Refills:  0        OMEPRAZOLE PO   Dose:  20 mg        Take 20 mg by mouth   Refills:  0        pramipexole 0.5 MG tablet   Commonly known as:  MIRAPEX        Take by mouth daily   Refills:  0        timolol 0.5 % ophthalmic solution   Commonly known as:  TIMOPTIC   Dose:  1 drop        Place 1 drop Into the left eye 2 times daily   Refills:  0        TYLENOL PO   Dose:  325 mg        Take 325 mg by mouth as needed for mild pain or fever   Refills:  0        XALATAN 0.005 % ophthalmic solution   Dose:  1 drop   Generic drug:  latanoprost        Place 1 drop into both eyes At Bedtime   Refills:  0                Orders Needing Specimen Collection     None      Pending Results     No orders found from 4/25/2017 to 4/28/2017.            Pending Culture Results     No orders found from 4/25/2017 to 4/28/2017.            Thank you for choosing [facility]       Thank you for choosing [facility] for your care. Our goal is always to provide you with excellent care. Hearing back from our patients is one way we can continue to improve our services. Please take a few minutes to complete the written survey that you may receive in the mail after you visit with us. Thank you!        [employer] Information     [email or web address] gives you secure access to your electronic health record. If you see a primary care provider, you can also send messages to your care team and make appointments. If you have questions, please call your primary care clinic.  If you do not have a primary care provider, please call [phone] and they will assist you.        Care EveryWhere ID     This is your Care EveryWhere ID. This could be used by other organizations to access your [facility] medical  records  [id number]        After Visit Summary       This is your record. Keep this with you and show to your community pharmacist(s) and doctor(s) at your next visit.

## 2017-04-27 NOTE — DISCHARGE INSTRUCTIONS
Please make an appointment to follow up with Your Wound Care Specialist through [facility] for further evaluation and recommendations. There is no evidence of skin infection on your abdominal wall. If your symptoms significantly worsen please seek immediate medical attention.    Abrasions  Abrasions are skin scrapes. Their treatment depends on how large and deep the abrasion is.  Home care   You may be prescribed an antibiotic cream or ointment to apply to the wound. This helps prevent infection. Follow instructions when using this medication.  General care    To care for the abrasion, do the following each day for as long as directed by your health care provider.    If you were given a bandage, change it once a day. If your bandage sticks to the wound, soak it in warm water until it loosens.    Wash the area with soap and warm water. You may do this in a sink or under a tub faucet or shower. Rinse off the soap. Then pat the area dry with a clean towel.    If antibiotic ointment or cream was prescribed, reapply it to the wound as directed. Cover the wound with a fresh non-stick bandage. If the bandage becomes wet or dirty, change it as soon as possible.    You may use acetaminophen or ibuprofen to control pain unless another pain medication was prescribed. Note: If you have chronic liver or kidney disease or ever had a stomach ulcer or GI bleeding, talk with your health care provider before using these medications. Do not use ibuprofen in children under six months of age.    Most skin wounds heal within ten days. But an infection may occur despite treatment. Therefore, monitor the wound for signs of infection as listed below.  Follow-up care  Follow up with your health care provider, or as advised.  When to seek medical advice  Call your health care provider right away if any of these occur:    Fever of 101 F (38.3 C) or higher, or as directed by your health care provider    Increasing pain,  redness, swelling, or drainage from the wound    Bleeding from the wound that does not stop after a few minutes of steady, firm pressure    Decreased ability to move any body part near wound    0885-7662 [employer]. [address] [place] [address], [place] [address]. All rights reserved. This information is not intended as a substitute for professional medical care. Always follow your healthcare professional's instructions.

## 2017-06-26 DIAGNOSIS — B20 HUMAN IMMUNODEFICIENCY VIRUS (HIV) DISEASE (H): Primary | ICD-10-CM

## 2017-06-29 ENCOUNTER — OFFICE VISIT (OUTPATIENT)
Dept: INTERNAL MEDICINE | Facility: CLINIC | Age: 72
End: 2017-06-29

## 2017-06-29 VITALS
BODY MASS INDEX: 22.59 KG/M2 | WEIGHT: 153 LBS | DIASTOLIC BLOOD PRESSURE: 80 MMHG | HEART RATE: 61 BPM | SYSTOLIC BLOOD PRESSURE: 128 MMHG

## 2017-06-29 DIAGNOSIS — R14.3 FLATULENCE, ERUCTATION AND GAS PAIN: ICD-10-CM

## 2017-06-29 DIAGNOSIS — R14.1 FLATULENCE, ERUCTATION AND GAS PAIN: ICD-10-CM

## 2017-06-29 DIAGNOSIS — Z13.220 SCREENING FOR HYPERLIPIDEMIA: ICD-10-CM

## 2017-06-29 DIAGNOSIS — R10.84 ABDOMINAL PAIN, GENERALIZED: Primary | ICD-10-CM

## 2017-06-29 DIAGNOSIS — I49.8 PERIODIC HEART FLUTTER: ICD-10-CM

## 2017-06-29 DIAGNOSIS — Z23 NEED FOR PNEUMOCOCCAL VACCINATION: ICD-10-CM

## 2017-06-29 DIAGNOSIS — R14.2 FLATULENCE, ERUCTATION AND GAS PAIN: ICD-10-CM

## 2017-06-29 PROBLEM — Z21 HIV (HUMAN IMMUNODEFICIENCY VIRUS INFECTION) (H): Status: RESOLVED | Noted: 2017-01-13 | Resolved: 2017-06-29

## 2017-06-29 PROBLEM — Z21 HIV (HUMAN IMMUNODEFICIENCY VIRUS INFECTION) (H): Status: ACTIVE | Noted: 2017-01-13

## 2017-06-29 LAB
ANION GAP SERPL CALCULATED.3IONS-SCNC: 5 MMOL/L (ref 3–14)
BUN SERPL-MCNC: 14 MG/DL (ref 7–30)
CALCIUM SERPL-MCNC: 8.6 MG/DL (ref 8.5–10.1)
CHLORIDE SERPL-SCNC: 107 MMOL/L (ref 94–109)
CHOLEST SERPL-MCNC: 136 MG/DL
CO2 SERPL-SCNC: 28 MMOL/L (ref 20–32)
CREAT SERPL-MCNC: 0.89 MG/DL (ref 0.66–1.25)
GFR SERPL CREATININE-BSD FRML MDRD: 84 ML/MIN/1.7M2
GLUCOSE SERPL-MCNC: 98 MG/DL (ref 70–99)
HDLC SERPL-MCNC: 35 MG/DL
HGB BLD-MCNC: 13.4 G/DL (ref 13.3–17.7)
LDLC SERPL CALC-MCNC: 76 MG/DL
NONHDLC SERPL-MCNC: 101 MG/DL
POTASSIUM SERPL-SCNC: 4.8 MMOL/L (ref 3.4–5.3)
SODIUM SERPL-SCNC: 140 MMOL/L (ref 133–144)
TRIGL SERPL-MCNC: 124 MG/DL
TSH SERPL DL<=0.005 MIU/L-ACNC: 1.98 MU/L (ref 0.4–4)

## 2017-06-29 ASSESSMENT — PAIN SCALES - GENERAL: PAINLEVEL: NO PAIN (0)

## 2017-06-29 NOTE — MR AVS SNAPSHOT
After Visit Summary   6/29/2017    Leighton Randall    MRN: [id number]           Patient Information     Date Of Birth          1945        Visit Information        Provider Department      6/29/2017 7:30 AM Katie Lantigua APRN [facility]        Today's Diagnoses     Abdominal pain, generalized    -  1    Flatulence, eructation and gas pain        Periodic heart flutter        Need for pneumococcal vaccination        Screening for hyperlipidemia          Care Instructions    Primary Care Center Medication Refill Request Information:  * Please contact your pharmacy regarding ANY request for medication refills.  ** [facility] Prescription Fax = [phone]  * Please allow 3 business days for routine medication refills.  * Please allow 5 business days for controlled substance medication refills.     Primary Care Center Test Result notification information:  *You will be notified within 7-10 days of your appointment day regarding the results of your test.  If you are on MyChart you will be notified as soon as the provider has reviewed the results and signed off on them.      Excess Gas  Certain foods produce gas when digested. In some people, these foods make an excessive amount of gas. This may cause bloating, burping, or increased gas passing through the rectum (flatulence).    Foods that cause gas  The following foods are more likely to cause this problem. Limit them, or remove them from your diet:    Broccoli    Cauliflower    [place] sprouts    Cabbage    Cooked dried beans    Fizzy (carbonated) drinks, such as sparkling water, soda, beer, and champagne  Other causes  Other causes of excess gas include:    Eating too fast or talking while you chew. This may cause you to swallow air. This increases the amount of gas in your stomach. And it may make your symptoms worse. Chew each mouthful completely before you swallow. Take your time.    Chewing on gum or sucking on hard  candy. These cause you to swallow more often. And some of what you are swallowing is air. This leads to more gas in your stomach. Avoid chewing gum and hard candy.    Overeating. This may increase the feeling of being bloated and cause more gas. When you are full, stop eating.     Being constipated. This can increase the amount of normal intestinal gas. Avoid constipation by getting more fiber in your diet. Good sources of fiber include whole-grain cereal, fresh vegetables (except those in the above list), and fresh fruits. High-fiber foods absorb water and carry it out of the body. When adding more fiber to your diet, you also need to drink more water. You should drink at least 8, 8-ounce glasses of water (2 quarts) per day.  Date Last Reviewed: 8/1/2016 2000-2017 The [employer]. [address] [place] [address]. All rights reserved. This information is not intended as a substitute for professional medical care. Always follow your healthcare professional's instructions.                Follow-ups after your visit        Additional Services     PHYSICAL THERAPY REFERRAL       *This therapy referral will be filtered to a centralized scheduling office at [facility] and the patient will receive a call to schedule an appointment at a [facility] location most convenient for them. *     [facility] provides Physical Therapy evaluation and treatment and many specialty services across the [facility] system.  If requesting a specialty program, please choose from the list below.    If you have not heard from the scheduling office within 2 business days, please call [phone] for all locations, with the exception of [place], please call [phone].  Treatment: Evaluation & Treatment  Special Instructions/Modalities: [facility] NICHOLAS Hampton, Doreen Vicente  Special Programs: None    Please be aware that coverage of these services is subject to the terms  "and limitations of your health insurance plan.  Call member services at your health plan with any benefit or coverage questions.      **Note to Provider:  If you are referring outside of [facility] for the therapy appointment, please list the name of the location in the \"special instructions\" above, print the referral and give to the patient to schedule the appointment.                  Your next 10 appointments already scheduled     Jun 29, 2017  8:00 AM CDT   LAB with  LAB   [facility] ([place] [facility])    [address] [place] [address] [phone]           Patient must bring picture ID.  Patient should be prepared to give a urine specimen  Please do not eat 10-12 hours before your appointment if you are coming in fasting for labs on lipids, cholesterol, or glucose (sugar).  Pregnant women should follow their Care Team instructions. Water with medications is okay. Do not drink coffee or other fluids.   If you have concerns about taking  your medications, please ask at office or if scheduling via [employer], send a message by clicking on Secure Messaging, Message Your Care Team.            Aug 24, 2017  8:00 AM CDT   (Arrive by 7:45 AM)   Return Visit with Gail Kirkland MD   [facility] ([place] [facility])    [address] [place] [address] [phone]              Future tests that were ordered for you today     Open Future Orders        Priority Expected Expires Ordered    Lipid panel reflex to direct LDL Routine 6/29/2017 7/13/2017 6/29/2017    Holter Monitor 48 hour - Adult Routine  8/13/2017 6/29/2017            Who to contact     Please call your clinic at [phone] to:    Ask questions about your health    Make or cancel appointments    Discuss your medicines    Learn about your test results    Speak to your doctor   If you have compliments or concerns " about an experience at your clinic, or if you wish to file a complaint, please contact [facility] Patient Relations at [phone] or email us at [email or web address]         Additional Information About Your Visit        "[employer]hart Information     "[employer]hart gives you secure access to your electronic health record. If you see a primary care provider, you can also send messages to your care team and make appointments. If you have questions, please call your primary care clinic.  If you do not have a primary care provider, please call [phone] and they will assist you.      [facility] is an electronic gateway that provides easy, online access to your medical records. With [employer], you can request a clinic appointment, read your test results, renew a prescription or communicate with your care team.     To access your existing account, please contact your [facility] or call [phone] for assistance.        Care EveryWhere ID     This is your Care EveryWhere ID. This could be used by other organizations to access your [facility] medical records  [id number]        Your Vitals Were     Pulse BMI (Body Mass Index)                61 22.59 kg/m2           Blood Pressure from Last 3 Encounters:   06/29/17 128/80   04/27/17 (!) 134/95   02/20/17 154/82    Weight from Last 3 Encounters:   06/29/17 69.4 kg (153 lb)   04/27/17 (P) 71 kg (156 lb 9.6 oz)   02/20/17 69.4 kg (153 lb)              We Performed the Following     PHYSICAL THERAPY REFERRAL     Pneumococcal vaccine 23 valent PPSV23  (Pneumovax) [32510]        Primary Care Provider Office Phone # Fax #    Digna Tay [phone]       [address] [address]        Equal Access to Services     MAXIM RAMÍREZ : eliceo Beltran qaybta kaalmada adeegyada, waxay idiin hayaan adeeg kharash la'aan ah. So [facility]  [phone].    ATENCIÓN: Si zuly kaminski, tiene a layton disposición servicios gratuitos de asistencia lingüística. Aldo chambers [phone].    We comply with applicable federal civil rights laws and [place] laws. We do not discriminate on the basis of race, color, national origin, age, disability sex, sexual orientation or gender identity.            Thank you!     Thank you for choosing [facility]  for your care. Our goal is always to provide you with excellent care. Hearing back from our patients is one way we can continue to improve our services. Please take a few minutes to complete the written survey that you may receive in the mail after your visit with us. Thank you!             Your Updated Medication List - Protect others around you: Learn how to safely use, store and throw away your medicines at www.disposemymeds.org.          This list is accurate as of: 6/29/17  7:49 AM.  Always use your most recent med list.                   Brand Name Dispense Instructions for use Diagnosis    BENADRYL PO      Take 25 mg by mouth as needed        cetirizine 10 MG tablet    zyrTEC     Take 10 mg by mouth daily as needed for allergies        dolutegravir 50 MG tablet    TIVICAY    90 tablet    Take 1 tablet (50 mg) by mouth daily    Human immunodeficiency virus (HIV) disease (H)       emtricitabine-tenofovir -25 MG per tablet    DESCOVY    90 tablet    Take 1 tablet by mouth daily    Human immunodeficiency virus (HIV) disease (H)       loratadine 10 MG tablet    CLARITIN     Take 10 mg by mouth as needed for allergies        OMEPRAZOLE PO      Take 20 mg by mouth        pramipexole 0.5 MG tablet    MIRAPEX     Take by mouth daily        timolol 0.5 % ophthalmic solution    TIMOPTIC     Place 1 drop Into the left eye 2 times daily        TYLENOL PO      Take 325 mg by mouth as needed for mild pain or fever        XALATAN 0.005 % ophthalmic solution   Generic drug:  latanoprost      Place 1 drop  into both eyes At Bedtime

## 2017-06-29 NOTE — PATIENT INSTRUCTIONS
Primary Care Center Medication Refill Request Information:  * Please contact your pharmacy regarding ANY request for medication refills.  ** [facility] Prescription Fax = [phone]  * Please allow 3 business days for routine medication refills.  * Please allow 5 business days for controlled substance medication refills.     Primary Care Center Test Result notification information:  *You will be notified within 7-10 days of your appointment day regarding the results of your test.  If you are on MyChart you will be notified as soon as the provider has reviewed the results and signed off on them.      Excess Gas  Certain foods produce gas when digested. In some people, these foods make an excessive amount of gas. This may cause bloating, burping, or increased gas passing through the rectum (flatulence).    Foods that cause gas  The following foods are more likely to cause this problem. Limit them, or remove them from your diet:    Broccoli    Cauliflower    [place] sprouts    Cabbage    Cooked dried beans    Fizzy (carbonated) drinks, such as sparkling water, soda, beer, and champagne  Other causes  Other causes of excess gas include:    Eating too fast or talking while you chew. This may cause you to swallow air. This increases the amount of gas in your stomach. And it may make your symptoms worse. Chew each mouthful completely before you swallow. Take your time.    Chewing on gum or sucking on hard candy. These cause you to swallow more often. And some of what you are swallowing is air. This leads to more gas in your stomach. Avoid chewing gum and hard candy.    Overeating. This may increase the feeling of being bloated and cause more gas. When you are full, stop eating.     Being constipated. This can increase the amount of normal intestinal gas. Avoid constipation by getting more fiber in your diet. Good sources of fiber include whole-grain cereal, fresh vegetables (except those in the above list), and fresh fruits.  High-fiber foods absorb water and carry it out of the body. When adding more fiber to your diet, you also need to drink more water. You should drink at least 8, 8-ounce glasses of water (2 quarts) per day.  Date Last Reviewed: 8/1/2016 2000-2017 The [employer]. [address] [place] [address], [place] [address]. All rights reserved. This information is not intended as a substitute for professional medical care. Always follow your healthcare professional's instructions.      Please schedule the following appointments:  Holter Monitor [phone] (3rd Floor [facility])

## 2017-06-29 NOTE — NURSING NOTE
Chief Complaint   Patient presents with     Establish Care     Pt is here to establish care. Was a pt of Dr. Aries Cormier, GORAN June 29, 2017 7:07 AM

## 2017-07-05 ENCOUNTER — ALLIED HEALTH/NURSE VISIT (OUTPATIENT)
Dept: CARDIOLOGY | Facility: CLINIC | Age: 72
End: 2017-07-05
Payer: COMMERCIAL

## 2017-07-05 DIAGNOSIS — I49.8 PERIODIC HEART FLUTTER: ICD-10-CM

## 2017-07-05 PROCEDURE — 93227 XTRNL ECG REC<48 HR R&I: CPT | Performed by: INTERNAL MEDICINE

## 2017-07-05 PROCEDURE — 93225 XTRNL ECG REC<48 HRS REC: CPT | Mod: ZF

## 2017-07-05 PROCEDURE — 93226 XTRNL ECG REC<48 HR SCAN A/R: CPT

## 2017-07-05 NOTE — MR AVS SNAPSHOT
After Visit Summary   7/5/2017    Leighton Randall    MRN: [id number]           Patient Information     Date Of Birth          1945        Visit Information        Provider Department      7/5/2017 9:00 AM Tech, [facility] Cvc Monitor, [facility]        Today's Diagnoses     Periodic heart flutter           Follow-ups after your visit        Your next 10 appointments already scheduled     Aug 24, 2017  8:00 AM CDT   (Arrive by 7:45 AM)   Return Visit with Gail Kirkland MD   [facility] ([facility])    [address] [place] [address]  3rd [facility] [address] [phone]              Who to contact     If you have questions or need follow up information about today's clinic visit or your schedule please contact [facility] directly at [phone].  Normal or non-critical lab and imaging results will be communicated to you by MyChart, letter or phone within 4 business days after the clinic has received the results. If you do not hear from us within 7 days, please contact the clinic through [facility]SYNARChart or phone. If you have a critical or abnormal lab result, we will notify you by phone as soon as possible.  Submit refill requests through [employer] or call your pharmacy and they will forward the refill request to us. Please allow 3 business days for your refill to be completed.          Additional Information About Your Visit        MyChart Information     [employer] gives you secure access to your electronic health record. If you see a primary care provider, you can also send messages to your care team and make appointments. If you have questions, please call your primary care clinic.  If you do not have a primary care provider, please call [phone] and they will assist you.        Care EveryWhere ID     This is your Care EveryWhere ID. This could be used by other organizations to access your [facility]  records  [id number]         Blood Pressure from Last 3 Encounters:   06/29/17 128/80   04/27/17 (!) 134/95   02/20/17 154/82    Weight from Last 3 Encounters:   06/29/17 69.4 kg (153 lb)   04/27/17 (P) 71 kg (156 lb 9.6 oz)   02/20/17 69.4 kg (153 lb)              We Performed the Following     Holter Monitor 48 hour - Adult        Primary Care Provider Office Phone # Fax #    Katie NaranjoTHOMAS Aguirre [facility] [phone]       [address] [address]        Equal Access to Services     MAXIM RAMÍREZ : Hadii justin giffordo Soalicia, waaxda luqadaha, qaybta kaalmada adebashiryada, elda giordano . So [facility] [phone].    ATENCIÓN: Si habla español, tiene a layton disposición servicios gratuitos de asistencia lingüística. Llame al [phone].    We comply with applicable federal civil rights laws and [place] laws. We do not discriminate on the basis of race, color, national origin, age, disability sex, sexual orientation or gender identity.            Thank you!     Thank you for choosing [facility]  for your care. Our goal is always to provide you with excellent care. Hearing back from our patients is one way we can continue to improve our services. Please take a few minutes to complete the written survey that you may receive in the mail after your visit with us. Thank you!             Your Updated Medication List - Protect others around you: Learn how to safely use, store and throw away your medicines at www.disposemymeds.org.          This list is accurate as of: 7/5/17  9:15 AM.  Always use your most recent med list.                   Brand Name Dispense Instructions for use Diagnosis    BENADRYL PO      Take 25 mg by mouth as needed        cetirizine 10 MG tablet    zyrTEC     Take 10 mg by mouth daily as needed for allergies        dolutegravir 50 MG tablet    TIVICAY    90 tablet    Take 1 tablet (50 mg) by mouth daily    Human  immunodeficiency virus (HIV) disease (H)       emtricitabine-tenofovir -25 MG per tablet    DESCOVY    90 tablet    Take 1 tablet by mouth daily    Human immunodeficiency virus (HIV) disease (H)       loratadine 10 MG tablet    CLARITIN     Take 10 mg by mouth as needed for allergies        OMEPRAZOLE PO      Take 20 mg by mouth        pramipexole 0.5 MG tablet    MIRAPEX     Take by mouth daily        timolol 0.5 % ophthalmic solution    TIMOPTIC     Place 1 drop Into the left eye 2 times daily        TYLENOL PO      Take 325 mg by mouth as needed for mild pain or fever        XALATAN 0.005 % ophthalmic solution   Generic drug:  latanoprost      Place 1 drop into both eyes At Bedtime

## 2017-07-05 NOTE — NURSING NOTE
Per Dr. Katie Lantigua, patient to have 48 hr  monitor placed.  Diagnosis: periodic heart failure  Monitor placed: Yes  Patient Instructed: Yes  Patient verbalized understanding: Yes  Holter # 2  Card ID: [id number]    Placed by DI puente

## 2019-09-30 ENCOUNTER — HEALTH MAINTENANCE LETTER (OUTPATIENT)
Age: 74
End: 2019-09-30

## 2020-03-15 ENCOUNTER — HEALTH MAINTENANCE LETTER (OUTPATIENT)
Age: 75
End: 2020-03-15

## 2021-01-15 ENCOUNTER — HEALTH MAINTENANCE LETTER (OUTPATIENT)
Age: 76
End: 2021-01-15

## 2021-05-09 ENCOUNTER — HEALTH MAINTENANCE LETTER (OUTPATIENT)
Age: 76
End: 2021-05-09

## 2021-10-24 ENCOUNTER — HEALTH MAINTENANCE LETTER (OUTPATIENT)
Age: 76
End: 2021-10-24

## 2022-06-05 ENCOUNTER — HEALTH MAINTENANCE LETTER (OUTPATIENT)
Age: 77
End: 2022-06-05

## 2022-10-15 ENCOUNTER — HEALTH MAINTENANCE LETTER (OUTPATIENT)
Age: 77
End: 2022-10-15

## 2023-06-11 ENCOUNTER — HEALTH MAINTENANCE LETTER (OUTPATIENT)
Age: 78
End: 2023-06-11